# Patient Record
Sex: FEMALE | Race: WHITE | Employment: OTHER | URBAN - METROPOLITAN AREA
[De-identification: names, ages, dates, MRNs, and addresses within clinical notes are randomized per-mention and may not be internally consistent; named-entity substitution may affect disease eponyms.]

---

## 2017-01-16 ENCOUNTER — HOSPITAL ENCOUNTER (OUTPATIENT)
Dept: PHYSICAL THERAPY | Age: 72
Discharge: HOME | End: 2017-01-31
Attending: FAMILY MEDICINE | Admitting: FAMILY MEDICINE

## 2017-01-16 ASSESSMENT — PAIN SCALES - GENERAL: PAINLEVEL_OUTOF10: 4

## 2017-01-16 ASSESSMENT — PAIN DESCRIPTION - ORIENTATION: ORIENTATION: RIGHT

## 2017-01-16 ASSESSMENT — PAIN DESCRIPTION - LOCATION: LOCATION: SHOULDER

## 2017-01-16 ASSESSMENT — PAIN DESCRIPTION - ONSET: ONSET: SUDDEN

## 2017-01-16 ASSESSMENT — PAIN DESCRIPTION - PAIN TYPE: TYPE: ACUTE PAIN

## 2017-01-16 ASSESSMENT — PAIN DESCRIPTION - FREQUENCY: FREQUENCY: INTERMITTENT

## 2017-01-16 ASSESSMENT — PAIN DESCRIPTION - PROGRESSION: CLINICAL_PROGRESSION: NOT CHANGED

## 2017-01-23 ENCOUNTER — HOSPITAL ENCOUNTER (OUTPATIENT)
Dept: PHYSICAL THERAPY | Age: 72
Discharge: HOME | End: 2017-01-23

## 2017-01-26 ENCOUNTER — HOSPITAL ENCOUNTER (OUTPATIENT)
Dept: PHYSICAL THERAPY | Age: 72
Discharge: HOME | End: 2017-01-26

## 2017-02-01 ENCOUNTER — HOSPITAL ENCOUNTER (OUTPATIENT)
Dept: OTHER | Age: 72
Discharge: HOME | End: 2017-02-28
Attending: FAMILY MEDICINE | Admitting: FAMILY MEDICINE

## 2017-02-02 ENCOUNTER — HOSPITAL ENCOUNTER (OUTPATIENT)
Dept: PHYSICAL THERAPY | Age: 72
Discharge: HOME | End: 2017-02-02

## 2017-03-01 ENCOUNTER — HOSPITAL ENCOUNTER (OUTPATIENT)
Dept: OTHER | Age: 72
Discharge: HOME | End: 2017-03-06
Attending: FAMILY MEDICINE | Admitting: FAMILY MEDICINE

## 2020-07-16 ENCOUNTER — HOSPITAL ENCOUNTER (EMERGENCY)
Age: 75
Discharge: ANOTHER HOSPITAL | End: 2020-07-16
Attending: EMERGENCY MEDICINE
Payer: MEDICARE

## 2020-07-16 ENCOUNTER — HOSPITAL ENCOUNTER (INPATIENT)
Age: 75
LOS: 12 days | Discharge: NURSING, REHAB OR LONG-TERM CARE | DRG: 884 | End: 2020-07-28
Attending: PSYCHIATRY & NEUROLOGY | Admitting: PSYCHIATRY & NEUROLOGY
Payer: MEDICARE

## 2020-07-16 ENCOUNTER — APPOINTMENT (OUTPATIENT)
Dept: CT IMAGING | Age: 75
End: 2020-07-16
Payer: MEDICARE

## 2020-07-16 VITALS
BODY MASS INDEX: 22.67 KG/M2 | TEMPERATURE: 97 F | DIASTOLIC BLOOD PRESSURE: 82 MMHG | HEART RATE: 83 BPM | WEIGHT: 120 LBS | RESPIRATION RATE: 18 BRPM | SYSTOLIC BLOOD PRESSURE: 120 MMHG | OXYGEN SATURATION: 100 %

## 2020-07-16 LAB
ACETAMINOPHEN LEVEL: <5 UG/ML (ref 15–30)
ALBUMIN SERPL-MCNC: 4.5 GM/DL (ref 3.4–5)
ALCOHOL SCREEN SERUM: <0.01 %WT/VOL
ALP BLD-CCNC: 40 IU/L (ref 40–129)
ALT SERPL-CCNC: 10 U/L (ref 10–40)
AMPHETAMINES: NEGATIVE
ANION GAP SERPL CALCULATED.3IONS-SCNC: 12 MMOL/L (ref 4–16)
AST SERPL-CCNC: 18 IU/L (ref 15–37)
BACTERIA: ABNORMAL /HPF
BARBITURATE SCREEN URINE: NEGATIVE
BASOPHILS ABSOLUTE: 0 K/CU MM
BASOPHILS RELATIVE PERCENT: 0.5 % (ref 0–1)
BENZODIAZEPINE SCREEN, URINE: NEGATIVE
BILIRUB SERPL-MCNC: 0.3 MG/DL (ref 0–1)
BILIRUBIN URINE: NEGATIVE MG/DL
BLOOD, URINE: NEGATIVE
BUN BLDV-MCNC: 21 MG/DL (ref 6–23)
CALCIUM SERPL-MCNC: 9.7 MG/DL (ref 8.3–10.6)
CANNABINOID SCREEN URINE: NEGATIVE
CAST TYPE: ABNORMAL /HPF
CHLORIDE BLD-SCNC: 99 MMOL/L (ref 99–110)
CLARITY: ABNORMAL
CO2: 30 MMOL/L (ref 21–32)
COCAINE METABOLITE: NEGATIVE
COLOR: YELLOW
CREAT SERPL-MCNC: 0.8 MG/DL (ref 0.6–1.1)
CRYSTAL TYPE: NEGATIVE /HPF
DIFFERENTIAL TYPE: ABNORMAL
DOSE AMOUNT: ABNORMAL
DOSE AMOUNT: ABNORMAL
DOSE TIME: ABNORMAL
DOSE TIME: ABNORMAL
EOSINOPHILS ABSOLUTE: 0.2 K/CU MM
EOSINOPHILS RELATIVE PERCENT: 3.2 % (ref 0–3)
EPITHELIAL CELLS, UA: 2 /HPF
GFR AFRICAN AMERICAN: >60 ML/MIN/1.73M2
GFR NON-AFRICAN AMERICAN: >60 ML/MIN/1.73M2
GLUCOSE BLD-MCNC: 113 MG/DL (ref 70–99)
GLUCOSE, URINE: NEGATIVE MG/DL
HCT VFR BLD CALC: 37.2 % (ref 37–47)
HEMOGLOBIN: 12.6 GM/DL (ref 12.5–16)
IMMATURE NEUTROPHIL %: 0.3 % (ref 0–0.43)
KETONES, URINE: NEGATIVE MG/DL
LEUKOCYTE ESTERASE, URINE: ABNORMAL
LYMPHOCYTES ABSOLUTE: 1.7 K/CU MM
LYMPHOCYTES RELATIVE PERCENT: 28.8 % (ref 24–44)
MCH RBC QN AUTO: 30 PG (ref 27–31)
MCHC RBC AUTO-ENTMCNC: 33.9 % (ref 32–36)
MCV RBC AUTO: 88.6 FL (ref 78–100)
MONOCYTES ABSOLUTE: 0.4 K/CU MM
MONOCYTES RELATIVE PERCENT: 6.7 % (ref 0–4)
NITRITE URINE, QUANTITATIVE: NEGATIVE
OPIATES, URINE: NEGATIVE
OXYCODONE: NEGATIVE
PDW BLD-RTO: 11.9 % (ref 11.7–14.9)
PH, URINE: 8 (ref 5–8)
PHENCYCLIDINE, URINE: NEGATIVE
PLATELET # BLD: 306 K/CU MM (ref 140–440)
PMV BLD AUTO: 9.1 FL (ref 7.5–11.1)
POTASSIUM SERPL-SCNC: 3.3 MMOL/L (ref 3.5–5.1)
PROTEIN UA: NEGATIVE MG/DL
RBC # BLD: 4.2 M/CU MM (ref 4.2–5.4)
RBC URINE: 3 /HPF (ref 0–6)
SALICYLATE LEVEL: 0.6 MG/DL (ref 15–30)
SARS-COV-2, NAAT: NOT DETECTED
SEGMENTED NEUTROPHILS ABSOLUTE COUNT: 3.5 K/CU MM
SEGMENTED NEUTROPHILS RELATIVE PERCENT: 60.5 % (ref 36–66)
SODIUM BLD-SCNC: 141 MMOL/L (ref 135–145)
SOURCE: NORMAL
SPECIFIC GRAVITY UA: 1.01 (ref 1–1.03)
TOTAL IMMATURE NEUTOROPHIL: 0.02 K/CU MM
TOTAL PROTEIN: 7.2 GM/DL (ref 6.4–8.2)
UROBILINOGEN, URINE: 0.2 MG/DL (ref 0.2–1)
WBC # BLD: 5.9 K/CU MM (ref 4–10.5)
WBC UA: 8 /HPF (ref 0–5)

## 2020-07-16 PROCEDURE — 1240000000 HC EMOTIONAL WELLNESS R&B

## 2020-07-16 PROCEDURE — 81001 URINALYSIS AUTO W/SCOPE: CPT

## 2020-07-16 PROCEDURE — 99285 EMERGENCY DEPT VISIT HI MDM: CPT

## 2020-07-16 PROCEDURE — U0002 COVID-19 LAB TEST NON-CDC: HCPCS

## 2020-07-16 PROCEDURE — 85025 COMPLETE CBC W/AUTO DIFF WBC: CPT

## 2020-07-16 PROCEDURE — G0480 DRUG TEST DEF 1-7 CLASSES: HCPCS

## 2020-07-16 PROCEDURE — 70450 CT HEAD/BRAIN W/O DYE: CPT

## 2020-07-16 PROCEDURE — 80053 COMPREHEN METABOLIC PANEL: CPT

## 2020-07-16 PROCEDURE — 80307 DRUG TEST PRSMV CHEM ANLYZR: CPT
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
## 2020-07-16 NOTE — ED PROVIDER NOTES
applicable):  No follow-up provider specified. Disposition medications (if applicable):  New Prescriptions    No medications on file       ED Provider Disposition Time  DISPOSITION Decision To Transfer 07/16/2020 08:31:20 PM          Electronically signed by: Alysa Cristobal M.D., 7/16/2020 11:12 PM      This dictation was created with voice recognition software. While attempts have been made to review the dictation as it is transcribed, on occasion the spoken word can be misinterpreted by the technology leading to omissions or inappropriate words, phrases or sentences.         Genet Cason MD  07/16/20 6075
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
## 2020-07-16 NOTE — ED NOTES
Labs drawn per order. Pt tolerates well. Daughter remains at bedside. Sts will be staying with mother at this time. No acute distress. No further needs at this time.      Yair Borja RN  07/16/20 9302

## 2020-07-16 NOTE — ED NOTES
Pt assisted to BR and back to chair. Unable to urinate at this time. Daughter at bedside.      Mohamud Chávez RN  07/16/20 1914

## 2020-07-17 PROBLEM — F03.918 DEMENTIA WITH BEHAVIORAL DISTURBANCE: Status: ACTIVE | Noted: 2020-07-17

## 2020-07-17 PROBLEM — F01.518 SUBCORTICAL VASCULAR DEMENTIA WITH BEHAVIORAL DISTURBANCE: Chronic | Status: ACTIVE | Noted: 2020-07-17

## 2020-07-17 LAB — GLUCOSE BLD-MCNC: 108 MG/DL (ref 70–99)

## 2020-07-17 PROCEDURE — 87086 URINE CULTURE/COLONY COUNT: CPT

## 2020-07-17 PROCEDURE — 97161 PT EVAL LOW COMPLEX 20 MIN: CPT

## 2020-07-17 PROCEDURE — 6370000000 HC RX 637 (ALT 250 FOR IP): Performed by: NURSE PRACTITIONER

## 2020-07-17 PROCEDURE — 82140 ASSAY OF AMMONIA: CPT

## 2020-07-17 PROCEDURE — 1240000000 HC EMOTIONAL WELLNESS R&B

## 2020-07-17 PROCEDURE — 90792 PSYCH DIAG EVAL W/MED SRVCS: CPT | Performed by: NURSE PRACTITIONER

## 2020-07-17 PROCEDURE — 6370000000 HC RX 637 (ALT 250 FOR IP): Performed by: INTERNAL MEDICINE

## 2020-07-17 PROCEDURE — 97165 OT EVAL LOW COMPLEX 30 MIN: CPT

## 2020-07-17 PROCEDURE — 82962 GLUCOSE BLOOD TEST: CPT

## 2020-07-17 RX ORDER — ACETAMINOPHEN 325 MG/1
325 TABLET ORAL EVERY 4 HOURS PRN
Status: DISCONTINUED | OUTPATIENT
Start: 2020-07-17 | End: 2020-07-28 | Stop reason: HOSPADM

## 2020-07-17 RX ORDER — DIPHENHYDRAMINE HCL 25 MG
25 CAPSULE ORAL EVERY 6 HOURS PRN
Status: DISCONTINUED | OUTPATIENT
Start: 2020-07-17 | End: 2020-07-28 | Stop reason: HOSPADM

## 2020-07-17 RX ORDER — ACETAMINOPHEN 500 MG
500 TABLET ORAL EVERY 4 HOURS PRN
Status: DISCONTINUED | OUTPATIENT
Start: 2020-07-17 | End: 2020-07-28 | Stop reason: HOSPADM

## 2020-07-17 RX ORDER — LEVOTHYROXINE SODIUM 0.12 MG/1
125 TABLET ORAL
Status: DISCONTINUED | OUTPATIENT
Start: 2020-07-17 | End: 2020-07-18

## 2020-07-17 RX ORDER — ACETAMINOPHEN 325 MG/1
650 TABLET ORAL EVERY 4 HOURS PRN
Status: DISCONTINUED | OUTPATIENT
Start: 2020-07-17 | End: 2020-07-28 | Stop reason: HOSPADM

## 2020-07-17 RX ORDER — HALOPERIDOL 5 MG
5 TABLET ORAL EVERY 4 HOURS PRN
Status: DISCONTINUED | OUTPATIENT
Start: 2020-07-17 | End: 2020-07-28 | Stop reason: HOSPADM

## 2020-07-17 RX ORDER — TRAZODONE HYDROCHLORIDE 50 MG/1
50 TABLET ORAL NIGHTLY PRN
Status: DISCONTINUED | OUTPATIENT
Start: 2020-07-17 | End: 2020-07-28 | Stop reason: HOSPADM

## 2020-07-17 RX ORDER — EXEMESTANE 25 MG/1
25 TABLET ORAL DAILY
Status: DISCONTINUED | OUTPATIENT
Start: 2020-07-17 | End: 2020-07-18

## 2020-07-17 RX ORDER — HALOPERIDOL 5 MG/ML
5 INJECTION INTRAMUSCULAR EVERY 4 HOURS PRN
Status: DISCONTINUED | OUTPATIENT
Start: 2020-07-17 | End: 2020-07-28 | Stop reason: HOSPADM

## 2020-07-17 RX ORDER — DIVALPROEX SODIUM 125 MG/1
250 CAPSULE, COATED PELLETS ORAL NIGHTLY
Status: DISCONTINUED | OUTPATIENT
Start: 2020-07-17 | End: 2020-07-20

## 2020-07-17 RX ORDER — LORAZEPAM 2 MG/ML
1 INJECTION INTRAMUSCULAR EVERY 4 HOURS PRN
Status: DISCONTINUED | OUTPATIENT
Start: 2020-07-17 | End: 2020-07-28 | Stop reason: HOSPADM

## 2020-07-17 RX ORDER — PANTOPRAZOLE SODIUM 40 MG/1
40 TABLET, DELAYED RELEASE ORAL
Status: DISCONTINUED | OUTPATIENT
Start: 2020-07-18 | End: 2020-07-18

## 2020-07-17 RX ORDER — POLYETHYLENE GLYCOL 3350 17 G/17G
17 POWDER, FOR SOLUTION ORAL DAILY PRN
Status: DISCONTINUED | OUTPATIENT
Start: 2020-07-17 | End: 2020-07-28 | Stop reason: HOSPADM

## 2020-07-17 RX ORDER — DEXTROSE MONOHYDRATE 25 G/50ML
12.5 INJECTION, SOLUTION INTRAVENOUS PRN
Status: DISCONTINUED | OUTPATIENT
Start: 2020-07-17 | End: 2020-07-28 | Stop reason: HOSPADM

## 2020-07-17 RX ORDER — CEPHALEXIN 500 MG/1
500 CAPSULE ORAL EVERY 12 HOURS SCHEDULED
Status: DISCONTINUED | OUTPATIENT
Start: 2020-07-17 | End: 2020-07-25 | Stop reason: ALTCHOICE

## 2020-07-17 RX ORDER — EZETIMIBE 10 MG/1
10 TABLET ORAL DAILY
Status: DISCONTINUED | OUTPATIENT
Start: 2020-07-17 | End: 2020-07-18

## 2020-07-17 RX ORDER — NICOTINE POLACRILEX 4 MG
15 LOZENGE BUCCAL PRN
Status: DISCONTINUED | OUTPATIENT
Start: 2020-07-17 | End: 2020-07-28 | Stop reason: HOSPADM

## 2020-07-17 RX ORDER — DIPHENHYDRAMINE HYDROCHLORIDE 50 MG/ML
25 INJECTION INTRAMUSCULAR; INTRAVENOUS EVERY 6 HOURS PRN
Status: DISCONTINUED | OUTPATIENT
Start: 2020-07-17 | End: 2020-07-28 | Stop reason: HOSPADM

## 2020-07-17 RX ORDER — DEXTROSE MONOHYDRATE 50 MG/ML
1000 INJECTION, SOLUTION INTRAVENOUS PRN
Status: DISCONTINUED | OUTPATIENT
Start: 2020-07-17 | End: 2020-07-28 | Stop reason: HOSPADM

## 2020-07-17 RX ORDER — ASPIRIN 81 MG/1
81 TABLET ORAL DAILY
Status: DISCONTINUED | OUTPATIENT
Start: 2020-07-17 | End: 2020-07-18

## 2020-07-17 RX ORDER — LOSARTAN POTASSIUM 25 MG/1
25 TABLET ORAL DAILY
Status: DISCONTINUED | OUTPATIENT
Start: 2020-07-17 | End: 2020-07-18

## 2020-07-17 RX ORDER — LORAZEPAM 1 MG/1
1 TABLET ORAL EVERY 4 HOURS PRN
Status: DISCONTINUED | OUTPATIENT
Start: 2020-07-17 | End: 2020-07-28 | Stop reason: HOSPADM

## 2020-07-17 RX ADMIN — ASPIRIN 81 MG: 81 TABLET, COATED ORAL at 11:25

## 2020-07-17 RX ADMIN — DIVALPROEX SODIUM 250 MG: 125 CAPSULE ORAL at 20:19

## 2020-07-17 RX ADMIN — LEVOTHYROXINE SODIUM 125 MCG: 125 TABLET ORAL at 11:25

## 2020-07-17 RX ADMIN — METFORMIN HYDROCHLORIDE 500 MG: 500 TABLET ORAL at 17:08

## 2020-07-17 RX ADMIN — LOSARTAN POTASSIUM 25 MG: 25 TABLET ORAL at 11:25

## 2020-07-17 RX ADMIN — CEPHALEXIN 500 MG: 500 CAPSULE ORAL at 20:18

## 2020-07-17 RX ADMIN — EZETIMIBE 10 MG: 10 TABLET ORAL at 20:19

## 2020-07-17 ASSESSMENT — SLEEP AND FATIGUE QUESTIONNAIRES
DO YOU USE A SLEEP AID: NO
DO YOU HAVE DIFFICULTY SLEEPING: NO

## 2020-07-17 ASSESSMENT — LIFESTYLE VARIABLES: HISTORY_ALCOHOL_USE: NO

## 2020-07-17 ASSESSMENT — PAIN SCALES - GENERAL: PAINLEVEL_OUTOF10: 0

## 2020-07-17 NOTE — ED NOTES
Pt resting in chair. Daughter at bedside. No needs at this time.      Sandee Russell, YENI  07/16/20 2266

## 2020-07-17 NOTE — BH NOTE
Patient arrived on the unit at approximately [phone] via [facility] from [facility] ED. Patient placed in green gown and non skid socks. Patient is noted to be wearing glasses. Patient belongings collected. Patient has a pink bra (placed in bin 1049-1 in laundry room) and a pair of jeans that have been washed and returned to the patients room 1049-1. Patients other belongings collected black flats and a green shirt (has strings) and placed in locker 1049-A (CODE 1234). Provided patient with two of [facility] shirts, and a gown.

## 2020-07-17 NOTE — ED NOTES
Resting in chair. Daughter at bedside. Updates on plan of care. No needs at this time.      Baldomero Meigs, RN  07/16/20 5961

## 2020-07-17 NOTE — PROGRESS NOTES
Pt seen by therapist from approximately [phone] to complete pt's recreation/leisure assessment. Pt presented as angry with spouse and daughter. Pt has insight into her Alzheimer diagnosis and states \"I don't want to go through what my mother went through. \"  Pt at times able to make logical statements and at other times was word salad or responses was not r/t the question being asked. Pt did make several SI statements about wanting to ask the doctor to \"help me end it. \"  Pt able to identify 1 leisure interest.     Electronically signed by PETE Cisse MA on 7/17/2020 at 4:20 PM

## 2020-07-17 NOTE — CONSULTS
pertinent positives and negatives in hpi, otherwise negative. PHYSICAL EXAM:    Vitals:    Vitals:    07/17/20 0749   BP: 117/81   Pulse: 89   Resp: 18   Temp: 97.3 °F (36.3 °C)   SpO2:      No intake or output data in the 24 hours ending 07/17/20 0936    General appearance:No apparent distress, appears stated age and cooperative. HEENTNormal cephalic, atraumatic without obvious deformity. Pupils equal, round, and reactive to light. Extra ocular muscles intact. Conjunctivae/corneas clear. Neck: Supple, No jugular venous distention/bruits. Trachea midline without thyromegaly or adenopathy with full range of motion. Lungs: Clear to ascultation, bilaterally without Rales/Wheezes/Rhonchi with good respiratory effort. Heart: Regular rate and rhythm with Normal S1/S2 without  murmurs, rubs or gallops, point of maximum impulse non-displaced  Abdomen: Soft, non-tender or non-distended without rigidity or guarding and positive bowel sounds all four quadrants. Extremities: No clubbing, cyanosis, or edema bilaterally. Skin: Skin color, texture, turgor normal.  No rashes or lesions. Neurologic: Alert   neurovascularly intact with sensory/motor intact upper extremities/lower extremities, bilaterally. Cranial nerves:II-XII intact, grossly non-focal.    DATA:    CBC   Recent Labs     07/16/20  1845   WBC 5.9   HGB 12.6   HCT 37.2         BMP   Recent Labs     07/16/20  1845      K 3.3*   CL 99   CO2 30   BUN 21   CREATININE 0.8     LFT'S   Recent Labs     07/16/20  1845   AST 18   ALT 10   BILITOT 0.3   ALKPHOS 40     COAG No results for input(s): INR in the last 72 hours. CARDIAC ENZYMES  No results for input(s): CKTOTAL, CKMB, CKMBINDEX, TROPONINI in the last 72 hours.   U/A:    Lab Results   Component Value Date    COLORU YELLOW 07/16/2020    WBCUA 8 07/16/2020    RBCUA 3 07/16/2020    BACTERIA OCCASIONAL 07/16/2020    CLARITYU SLIGHTLY CLOUDY 07/16/2020    SPECGRAV 1.010 07/16/2020    LEUKOCYTESUR LARGE 07/16/2020    BLOODU NEGATIVE 07/16/2020       CXR:  I have reviewed the CXR with the following interpretation:  EKG:  I have reviewed the EKG with the following interpretation:    IMPRESSION/RECOMMENDATIONS:     uti- keflex  dm2-metformin, poct bid  Confusion- rule out metastatic cause, mri brain wo contrast. tsh pending; ua reviewed culture pending. Depression/dementia- psych management.     Diet: DIET GENERAL;  Code Status: Full Code      Patient Active Problem List   Diagnosis    Subcortical vascular dementia with [facility] disturbance ([facility] [address]    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HCC)    Diabetes mellitus ([facility] [address]    Hyperlipidemia    Hypertension    Thyroid disease       Gema Chen M.D.

## 2020-07-17 NOTE — PROGRESS NOTES
Speech Language Pathology  Viviana Montano   [birth year]  [phone]      Noted speech-language/cognitive evaluation ordered and chart reviewed 07/17/20. ST deferred assessment at this time d/t prioritization of work load and scheduling availability. Spoke with nursing regarding deferral and will plan to complete assessment as schedule allows.       Myrna Stephenson 87, CCC-SLP, 7/17/2020
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
## 2020-07-17 NOTE — PROGRESS NOTES
Occupational Therapy   Occupational Therapy Initial Assessment  Date: 2020   Patient Name: Cruz Pepe  MRN: [id number]     : 1945    Date of Service: 2020    Discharge Recommendations:  [address], 24 hour supervision or assist       Assessment   Performance deficits / Impairments: Decreased cognition  Assessment: 75 yo female admitted to [facility] unit with increased confusion with some insight that \"her brain is not right\"(per pt) and that they are looking at her brain. Physically, pt is I with ambulation although poor safety. She also id I with adls. No acute care OT goals are identified  Prognosis: Fair  Decision Making: Low Complexity  OT Education: OT Role;Orientation  REQUIRES OT FOLLOW UP: No  Activity Tolerance  Activity Tolerance: Patient Tolerated treatment well  Safety Devices  Safety Devices in place: Yes  Type of devices: Call light within reach; Left in bed           Patient Diagnosis(es): There were no encounter diagnoses. has a past medical history of 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[address] 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[address] Cancer ([address] Diabetes mellitus ([facility] [address] Hyperlipidemia, Hypertension, and Thyroid disease. has a past surgical history that includes Tonsillectomy; Hysterectomy; Cholecystectomy; Vulvectomy; knee surgery; Mastectomy; and lymphadenectomy.            Restrictions  Restrictions/Precautions  Restrictions/Precautions: Fall Risk    Subjective   General  Chart Reviewed: Yes  Patient assessed for rehabilitation services?: Yes     Social/Functional History  Social/Functional History  Lives With: Spouse  Type of Home: House  Home Layout: One level, Able to Live on Main level with bedroom/bathroom, Performs ADL's on one level  Home Access: Level entry  Bathroom Shower/Tub: Tub/Shower unit  Bathroom Toilet: Standard  Receives Help From: Family  ADL Assistance: [address]: Needs assistance  Homemaking Responsibilities: No  Ambulation Assistance: Independent  Transfer Assistance: Independent  Active : No  Mode of Transportation: Family       Objective   Vision: Impaired  Vision Exceptions: Wears glasses at all times  Hearing: Within functional limits    Orientation  Overall Orientation Status: (states she was born [birth year] but the chart says [birth year])  Observation/Palpation  Posture: Good  Observation: Pt was supine in bed HOB elevated  Balance  Sitting Balance: Independent  Standing Balance: Supervision(pt had 1 LOB when she didn't pay attention tobed being behind her)  ADL  UE Bathing: Supervision(has the AROM and was able to demonstrate she could do it)  LE Bathing: (simulated with AROM)  UE Dressing: Independent(shirt)  LE Dressing: Independent;Supervision(socks)  Tone RUE  RUE Tone: Normotonic  Tone LUE  LUE Tone: Normotonic     Bed mobility  Bridging: Independent  Supine to Sit: Independent  Sit to Supine: Independent  Transfers  Sit to stand: Independent  Stand to sit: Independent     Cognition  Overall Cognitive Status: Exceptions  Following Commands: Follows one step commands consistently  Attention Span: Attends with cues to redirect  Memory: Decreased short term memory  Problem Solving: Decreased awareness of errors  Insights: Decreased awareness of deficits  Initiation: Requires cues for some        Sensation  Overall Sensation Status: WFL        LUE AROM (degrees)  LUE AROM : WFL  RUE AROM (degrees)  RUE AROM : WFL  LUE Strength  Gross LUE Strength: WFL  RUE Strength  Gross RUE Strength: WFL                   Plan   Plan  Times per week: no OT goals identified    G-Code     OutComes Score                                                  AM-PAC Score       AM-PAC 6 click short form for inpatient daily activity:   How much help from another person does the patient currently need. .. Unable  Dep A Lot  Max A A Lot   Mod A A Little  Min A A Little   CGA  SBA None   Mod I  Indep  Sup   1.   Putting on and taking off regular

## 2020-07-17 NOTE — GROUP NOTE
Group Therapy Note    Date: 7/17/2020    Group Start Time: 0830  Group End Time: 2210    Number of Participants: 3/4    Type: Morning Goals Group/ Community Meeting    Group Topic/Objective: Set Goal For The Day and to review Unit Rules and Regulations. Patient's Goal:  Pt refused to state a goal.    Notes:  Pt presented as agitated and resistive to group. Pt refused to answer majority of therapist's questions and refused to fill out menu. Pt reports plans to refuse to eat while on the unit and states \"If you put food in front of me I will throw it at you. \"  Pt did state she feels tired and is angry at her daughter for having her admitted to the unit. Pt refused to rate her emotions and refused to state anything she is grateful for. Pt also not accepting of education on why family could not visit on the unit. Pt's nurse and NP informed. Depression (0-10): Pt endorsed, but refused to rate. Anxiety (0-10): 0    Irritability/Aggitation (0-10): Pt endorsed, but refused to rate. Status After Intervention:  Unchanged    Participation Level: Minimal    Participation Quality: Resistant    Speech:  normal    Thought Process/Content: Pt minimally engaged in group.     Affective Functioning: Congruent    Mood: irritable    Level of consciousness:  Preoccupied and Inattentive    Response to Learning: Resistant    Endings: None Reported    Modes of Intervention: Education, Support and Socialization    Discipline Responsible: Certified Therapeutic Recreation Specialist     Electronically signed by Sandee Vitale MA on 7/17/2020 at 9:10 AM

## 2020-07-17 NOTE — PROGRESS NOTES
Pt's daughter brought in clothing and personal items:    Rolling black luggage bag containing:      Kei Henriquez bear  2 books  Calabrese pants  Black socks  Pink underwear  Green underwear  Tan underwear  Black pants  2 white blouses  Blue butterfly nightgown  Pink flowered shirt  2 pair knee high hose  Pink cat night gown  Bra with breast prosthetic    Knee highs, socks and luggage placed in locker # 1049-01 code 5602

## 2020-07-17 NOTE — PROGRESS NOTES
Pt attended group this am for painting. Returned to room shortly after and refused breakfast and lunch. Med compliant and pleasant, but making comments about not wanting to go on living d/t dementia diagnosis. Emotional support given and listened to concerns. Given items from home including books and maria del rosario bear. Pt happy to have maria del rosario bear and holding it in bed. Urine sample obtained for urine culture and sent to lab. Pt resting comfortably in bed at this time.

## 2020-07-17 NOTE — GROUP NOTE
Group Therapy Note    Date: 7/17/2020    Group Start Time: 0394  Group End Time: 1600  Group Topic: Healthy Living/Wellness    [address]    PETE Grajeda, MA        Group Therapy Note    Attendees: 0/3       Notes:  Pt encouraged to attend, pt declined.        Discipline Responsible: Certified Therapeutic Recreation Specialist       Signature:  [place], [place]
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
## 2020-07-17 NOTE — PROGRESS NOTES
Pt came out for dinner after much encouragement. Ate all of chili, approx half of kavitha slaw and all of cake for dessert. Drank 240 lemonade and then retreated to her room.

## 2020-07-17 NOTE — GROUP NOTE
Group Therapy Note    Date: 7/17/2020    Group Start Time: 1030  Group End Time: 2763  Group Topic: Recreational    [address] [place], [place]        Group Therapy Note    Attendees: 4/4       Notes:  Pts participated in recreational therapy group; [address] Activity. Pts were encouraged to engage in a leisure activity to promote socialization, positive mood, and coping with negative emotions. Pt participated in the activity, but noted to require Mod prompting d/t wanting to insert herself into situation with agitated peer and demanding that therapist buy her supplies. Pt completed majority of her painting, but declined to finish reporting she was tired and wanting to lay down.      Status After Intervention:  Improved    Participation Level: Interactive    Participation Quality: Intrusive      Speech:  normal      Thought Process/Content: Delusional      Affective Functioning: Congruent      Mood: Bright      Level of consciousness:  Alert and Preoccupied      Response to Learning: Able to verbalize current knowledge/experience      Endings: None Reported    Modes of Intervention: Socialization and Activity      Discipline Responsible: Certified Therapeutic Recreation Specialist       Signature:  Abdelrahman Gardiner

## 2020-07-17 NOTE — PLAN OF CARE
[facility]  Initial Interdisciplinary Treatment Plan NOTE    Review Date & Time: 07/17/20  0830    Admission Type:   Admission Type:  Involuntary    Reason for admission:  Reason for Admission: Dementia with behavioral disturbance    Patient Admission Diagnosis:   Dementia with behavioral disturbance ([facility]     PATIENT STRENGTHS:  Patient Strengths Strengths: Connection to output provider, Positive Support, Medication Compliance, Social Skills  Patient Strengths and Limitations:Limitations: Perceives need for assistance with self-care, Unrealistic self-view  Addictive Behavior:Addictive Behavior  In the past 3 months, have you felt or has someone told you that you have a problem with:  : None  Do you have a history of Chemical Use?: No  Do you have a history of Alcohol Use?: No  Do you have a history of Street Drug Abuse?: No  Histroy of Prescripton Drug Abuse?: No  Medical Problems:  Past Medical History:   Diagnosis Date    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[facility]     Cancer ([facility]     left breast    Diabetes mellitus ([facility]     Hyperlipidemia     Hypertension     Thyroid disease        EDUCATION:   Learner Progress Toward Treatment Goals: Reviewed goals and plan of care    Method: Group    Outcome: Needs reinforcement and No evidence of Learning    PATIENT GOALS: Med titration, compliance with unit programming    PLAN/TREATMENT RECOMMENDATIONS UPDATE: Med titration    Estimated Length of Stay Update:  7 days  Estimated Discharge Date Update: 07/24/20  Discharge Criteria: Med titration    SHORT-TERM GOALS UPDATE:   Time frame for Short-Term Goals: 7 days    LONG-TERM GOALS UPDATE:   Time frame for Long-Term Goals: 7 days  Members Present in Team Meeting: See Signature Sheet      Ceil Mckenzie, MSW, LSW

## 2020-07-17 NOTE — BH NOTE
[facility]  Admission Note     Admission Type:   Admission Type:  Involuntary    Reason for admission:  Reason for Admission: Dementia with behavioral disturbance    PATIENT STRENGTHS:  Strengths: Connection to output provider, Positive Support, Medication Compliance, Social Skills    Patient Strengths and Limitations:  Limitations: Perceives need for assistance with self-care, Unrealistic self-view    Addictive Behavior:   Addictive Behavior  In the past 3 months, have you felt or has someone told you that you have a problem with:  : None  Do you have a history of Chemical Use?: No  Do you have a history of Alcohol Use?: No  Do you have a history of Street Drug Abuse?: No  Histroy of Prescripton Drug Abuse?: No    Medical Problems:   Past Medical History:   Diagnosis Date    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HCC)     Cancer ([facility] [facility]     left breast    Diabetes mellitus ([facility]     Hyperlipidemia     Hypertension     Thyroid disease        Status EXAM:  Status and Exam  Normal: No  Facial Expression: Brightened  Affect: Appropriate  Level of Consciousness: Confused  Mood:Normal: Yes  Motor Activity:Normal: Yes  Interview Behavior: Cooperative  Preception: [place] to Person, [place] to Place  Attention:Normal: Yes  Thought Processes: Tangential, Loose Assoc., Flt.of Ideas  Thought Content:Normal: No  Thought Content: Obsessions  Hallucinations: None  Delusions: Yes  Delusions: Obsessions  Memory:Normal: No  Memory: Poor Remote, Poor Recent  Insight and Judgment: No  Insight and Judgment: Poor Judgment, Poor Insight, Unrealistic  Present Suicidal Ideation: No  Present Homicidal Ideation: No    Tobacco Screening:  Practical Counseling, on admission, meghan X, if applicable and completed (first 3 are required if patient doesn't refuse):            ( )  Recognizing danger situations (included triggers and roadblocks)                    ( )  Coping skills (new ways to manage stress, exercise, relaxation techniques, changing routine, distraction)                                                           ( )  Basic information about quitting (benefits of quitting, techniques in how to quit, available resources  ( ) Referral for counseling faxed to [facility]                                           ( ) Patient refused counseling  (X) Patient has not smoked in the last 30 days      Pt admitted with followings belongings:  Dentures: None  Vision - Corrective Lenses: Glasses  Hearing Aid: None  Jewelry: None  Body Piercings Removed: N/A  Clothing: Undergarments (Comment), Pants, Shirt, Footwear  Were All Patient Medications Collected?: Not Applicable  Other Valuables: None     Valuables sent home with N/A. Valuables placed in safe in security envelope, number: N/A. Patient's home medications were N/A. Patient oriented to surroundings and program expectations and copy of patient rights given. Patient offered Psychiatric Advanced Directive: Refused. Received admission packet:  Yes. Consents reviewed, signed Yes. Patient verbalize understanding:  Yes. Patient education on precautions: Yes. Pt's shoes and blouse placed in locker 1049-1 with combination 1234. Pt has a prosthetic bra that is in her bin, in the laundry room when she is not wearing it.                Joshua Figueroa, RN, BSN

## 2020-07-17 NOTE — CARE COORDINATION
7/17/20, 9:45 AM EDT    **Assessment completed with patient's daughter and [address]    Reason for Referral: [facility] initial assessment  Mental Status: Confused  Decision Making Ability: No  Family/Social/Home Environment: spoke with POA who states the patient currently resides with Spouse/Significant Other  in a ranch style home and their support system includes Spouse/Significant Other, Children, Family Members. Current Services/ Equipment:   None  Patient Income source:Unemployed, and Income meets expenses. Concerns or Barriers to Discharge: yes - possible placement needed, HHC if not placed  Patient and/or family verbalized understanding of the plan of care and contributed to goal setting. Social/ Functional History    Lives With:    Type of Home: House  Home Layout: One level, Able to Live on Main level with bedroom/bathroom, Performs ADL's on one level  Home Access: Level entry  Bathroom Shower/ Tub: Tub/Shower unit, Walk-in shower  Bathroom Toilet: Standard  Other [address]:    Home Equipment[de-identified]    ADL Assistance: Needs assistance  Homemaking Assistance: Needs assistance  Ambulation Assistance: Independent  Transfer Assistance: Independent  Additional Comments:      Anticipated Needs per Patient:     Potential Assistance Needed: Extended Care Placement   Type of [address]  Patient expects to be discharged to: Home or SNF        Discharge Plan:  D/C to home or SNF, Cece Sheppard needed if going home. F/U [facility], no transport needed if going home.     Tiffanie Frye, MSW, LSW

## 2020-07-18 ENCOUNTER — APPOINTMENT (OUTPATIENT)
Dept: MRI IMAGING | Age: 75
DRG: 884 | End: 2020-07-18
Attending: PSYCHIATRY & NEUROLOGY
Payer: MEDICARE

## 2020-07-18 LAB
AMMONIA: 29 UMOL/L (ref 11–51)
ANION GAP SERPL CALCULATED.3IONS-SCNC: 15 MMOL/L (ref 4–16)
BASOPHILS ABSOLUTE: 0 K/CU MM
BASOPHILS RELATIVE PERCENT: 0.6 % (ref 0–1)
BUN BLDV-MCNC: 15 MG/DL (ref 6–23)
CALCIUM SERPL-MCNC: 9.7 MG/DL (ref 8.3–10.6)
CHLORIDE BLD-SCNC: 103 MMOL/L (ref 99–110)
CHOLESTEROL: 171 MG/DL
CO2: 25 MMOL/L (ref 21–32)
CREAT SERPL-MCNC: 0.8 MG/DL (ref 0.6–1.1)
DIFFERENTIAL TYPE: ABNORMAL
EOSINOPHILS ABSOLUTE: 0.2 K/CU MM
EOSINOPHILS RELATIVE PERCENT: 3.4 % (ref 0–3)
ESTIMATED AVERAGE GLUCOSE: 108 MG/DL
GFR AFRICAN AMERICAN: >60 ML/MIN/1.73M2
GFR NON-AFRICAN AMERICAN: >60 ML/MIN/1.73M2
GLUCOSE BLD-MCNC: 105 MG/DL (ref 70–99)
GLUCOSE BLD-MCNC: 107 MG/DL (ref 70–99)
GLUCOSE BLD-MCNC: 109 MG/DL (ref 70–99)
GLUCOSE BLD-MCNC: 137 MG/DL (ref 70–99)
HBA1C MFR BLD: 5.4 % (ref 4.2–6.3)
HCT VFR BLD CALC: 37.3 % (ref 37–47)
HDLC SERPL-MCNC: 39 MG/DL
HEMOGLOBIN: 12.3 GM/DL (ref 12.5–16)
IMMATURE NEUTROPHIL %: 1.2 % (ref 0–0.43)
LDL CHOLESTEROL DIRECT: 117 MG/DL
LYMPHOCYTES ABSOLUTE: 1.6 K/CU MM
LYMPHOCYTES RELATIVE PERCENT: 31.6 % (ref 24–44)
MCH RBC QN AUTO: 29.5 PG (ref 27–31)
MCHC RBC AUTO-ENTMCNC: 33 % (ref 32–36)
MCV RBC AUTO: 89.4 FL (ref 78–100)
MONOCYTES ABSOLUTE: 0.4 K/CU MM
MONOCYTES RELATIVE PERCENT: 8.3 % (ref 0–4)
PDW BLD-RTO: 11.8 % (ref 11.7–14.9)
PLATELET # BLD: 272 K/CU MM (ref 140–440)
PMV BLD AUTO: 8.9 FL (ref 7.5–11.1)
POTASSIUM SERPL-SCNC: 4.3 MMOL/L (ref 3.5–5.1)
RBC # BLD: 4.17 M/CU MM (ref 4.2–5.4)
SEGMENTED NEUTROPHILS ABSOLUTE COUNT: 2.8 K/CU MM
SEGMENTED NEUTROPHILS RELATIVE PERCENT: 54.9 % (ref 36–66)
SODIUM BLD-SCNC: 143 MMOL/L (ref 135–145)
T4 FREE: 2.14 NG/DL (ref 0.9–1.8)
TOTAL IMMATURE NEUTOROPHIL: 0.06 K/CU MM
TOTAL RETICULOCYTE COUNT: 0.05 K/CU MM
TRIGL SERPL-MCNC: 123 MG/DL
TSH HIGH SENSITIVITY: 0.04 UIU/ML (ref 0.27–4.2)
WBC # BLD: 5 K/CU MM (ref 4–10.5)

## 2020-07-18 PROCEDURE — 83721 ASSAY OF BLOOD LIPOPROTEIN: CPT

## 2020-07-18 PROCEDURE — 82962 GLUCOSE BLOOD TEST: CPT

## 2020-07-18 PROCEDURE — 84439 ASSAY OF FREE THYROXINE: CPT

## 2020-07-18 PROCEDURE — 6370000000 HC RX 637 (ALT 250 FOR IP): Performed by: NURSE PRACTITIONER

## 2020-07-18 PROCEDURE — 85025 COMPLETE CBC W/AUTO DIFF WBC: CPT

## 2020-07-18 PROCEDURE — 36415 COLL VENOUS BLD VENIPUNCTURE: CPT

## 2020-07-18 PROCEDURE — 99233 SBSQ HOSP IP/OBS HIGH 50: CPT | Performed by: NURSE PRACTITIONER

## 2020-07-18 PROCEDURE — 83036 HEMOGLOBIN GLYCOSYLATED A1C: CPT

## 2020-07-18 PROCEDURE — 82140 ASSAY OF AMMONIA: CPT

## 2020-07-18 PROCEDURE — 1240000000 HC EMOTIONAL WELLNESS R&B

## 2020-07-18 PROCEDURE — 80061 LIPID PANEL: CPT

## 2020-07-18 PROCEDURE — 6370000000 HC RX 637 (ALT 250 FOR IP): Performed by: INTERNAL MEDICINE

## 2020-07-18 PROCEDURE — 80048 BASIC METABOLIC PNL TOTAL CA: CPT

## 2020-07-18 PROCEDURE — 84443 ASSAY THYROID STIM HORMONE: CPT

## 2020-07-18 RX ORDER — ACETAMINOPHEN 160 MG
25 TABLET,DISINTEGRATING ORAL
COMMUNITY

## 2020-07-18 RX ORDER — CLOBETASOL PROPIONATE 0.5 MG/G
CREAM TOPICAL 2 TIMES DAILY
COMMUNITY

## 2020-07-18 RX ORDER — LEVOTHYROXINE SODIUM 0.1 MG/1
100 TABLET ORAL
Status: DISCONTINUED | OUTPATIENT
Start: 2020-07-19 | End: 2020-07-28 | Stop reason: HOSPADM

## 2020-07-18 RX ORDER — CLOBETASOL PROPIONATE 0.5 MG/G
CREAM TOPICAL 2 TIMES DAILY
Status: DISCONTINUED | OUTPATIENT
Start: 2020-07-18 | End: 2020-07-28 | Stop reason: HOSPADM

## 2020-07-18 RX ORDER — VALSARTAN AND HYDROCHLOROTHIAZIDE 160; 25 MG/1; MG/1
1 TABLET ORAL DAILY
COMMUNITY

## 2020-07-18 RX ORDER — VALSARTAN 160 MG/1
160 TABLET ORAL DAILY
Status: DISCONTINUED | OUTPATIENT
Start: 2020-07-18 | End: 2020-07-28 | Stop reason: HOSPADM

## 2020-07-18 RX ORDER — VALSARTAN AND HYDROCHLOROTHIAZIDE 160; 25 MG/1; MG/1
1 TABLET ORAL DAILY
Status: DISCONTINUED | OUTPATIENT
Start: 2020-07-18 | End: 2020-07-18 | Stop reason: SDUPTHER

## 2020-07-18 RX ORDER — HYDROCHLOROTHIAZIDE 25 MG/1
25 TABLET ORAL DAILY
Status: DISCONTINUED | OUTPATIENT
Start: 2020-07-18 | End: 2020-07-28 | Stop reason: HOSPADM

## 2020-07-18 RX ORDER — LANOLIN ALCOHOL/MO/W.PET/CERES
1000 CREAM (GRAM) TOPICAL DAILY
Status: DISCONTINUED | OUTPATIENT
Start: 2020-07-18 | End: 2020-07-28 | Stop reason: HOSPADM

## 2020-07-18 RX ORDER — AMLODIPINE BESYLATE 2.5 MG/1
2.5 TABLET ORAL EVERY EVENING
COMMUNITY

## 2020-07-18 RX ORDER — FAMOTIDINE 20 MG/1
20 TABLET, FILM COATED ORAL 2 TIMES DAILY
COMMUNITY

## 2020-07-18 RX ORDER — LANOLIN ALCOHOL/MO/W.PET/CERES
1000 CREAM (GRAM) TOPICAL DAILY
COMMUNITY

## 2020-07-18 RX ORDER — SERTRALINE HYDROCHLORIDE 25 MG/1
25 TABLET, FILM COATED ORAL DAILY
Status: DISCONTINUED | OUTPATIENT
Start: 2020-07-18 | End: 2020-07-19

## 2020-07-18 RX ORDER — FAMOTIDINE 10 MG
20 TABLET ORAL 2 TIMES DAILY
Status: DISCONTINUED | OUTPATIENT
Start: 2020-07-18 | End: 2020-07-28 | Stop reason: HOSPADM

## 2020-07-18 RX ORDER — AMLODIPINE BESYLATE 2.5 MG/1
2.5 TABLET ORAL EVERY EVENING
Status: DISCONTINUED | OUTPATIENT
Start: 2020-07-18 | End: 2020-07-27

## 2020-07-18 RX ORDER — MIRTAZAPINE 15 MG/1
7.5 TABLET, FILM COATED ORAL NIGHTLY
Status: ON HOLD | COMMUNITY
End: 2020-07-24 | Stop reason: HOSPADM

## 2020-07-18 RX ADMIN — CYANOCOBALAMIN TAB 1000 MCG 1000 MCG: 1000 TAB at 17:21

## 2020-07-18 RX ADMIN — ASPIRIN 81 MG: 81 TABLET, COATED ORAL at 10:02

## 2020-07-18 RX ADMIN — FAMOTIDINE 20 MG: 10 TABLET ORAL at 20:40

## 2020-07-18 RX ADMIN — METFORMIN HYDROCHLORIDE 500 MG: 500 TABLET ORAL at 10:02

## 2020-07-18 RX ADMIN — PANTOPRAZOLE SODIUM 40 MG: 40 TABLET, DELAYED RELEASE ORAL at 10:03

## 2020-07-18 RX ADMIN — EZETIMIBE 10 MG: 10 TABLET ORAL at 10:02

## 2020-07-18 RX ADMIN — LOSARTAN POTASSIUM 25 MG: 25 TABLET ORAL at 10:03

## 2020-07-18 RX ADMIN — LEVOTHYROXINE SODIUM 125 MCG: 125 TABLET ORAL at 10:02

## 2020-07-18 RX ADMIN — CLOBETASOL PROPIONATE: 0.5 CREAM TOPICAL at 21:10

## 2020-07-18 RX ADMIN — CEPHALEXIN 500 MG: 500 CAPSULE ORAL at 20:40

## 2020-07-18 RX ADMIN — CEPHALEXIN 500 MG: 500 CAPSULE ORAL at 10:03

## 2020-07-18 RX ADMIN — SERTRALINE 25 MG: 25 TABLET, FILM COATED ORAL at 12:12

## 2020-07-18 RX ADMIN — DIVALPROEX SODIUM 250 MG: 125 CAPSULE ORAL at 20:40

## 2020-07-18 ASSESSMENT — PAIN SCALES - GENERAL: PAINLEVEL_OUTOF10: 0

## 2020-07-18 NOTE — PLAN OF CARE
Problem: Altered Mood, Deterioration in Function:  Goal: Ability to perform activities of daily living will improve  Description: Ability to perform activities of daily living will improve  Outcome: Ongoing  Goal: Maintenance of adequate nutrition will improve  Description: Maintenance of adequate nutrition will improve  Outcome: Ongoing  Goal: Participates in care planning  Description: Participates in care planning  Outcome: Ongoing  Goal: Patient specific goal  Description: Patient specific goal  Outcome: Ongoing     Problem: Nutrition Deficit:  Goal: Ability to achieve adequate nutritional intake will improve  Description: Ability to achieve adequate nutritional intake will improve  Outcome: Ongoing
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
## 2020-07-18 NOTE — GROUP NOTE
Group Therapy Note    Date: 7/18/2020    Group Start Time: 8095  Group End Time: 1200  Group Topic: Psychoeducation    Alena Gu, MSWKARLYW    Group Therapy Note    Attendees: 3/4       Patient's Goal: \"to go home\"    Notes: Patient participated in group. Open discussion with peers about music and mental health. Patient listen to and sang along with songs played in the group. Status After Intervention:  Improved    Participation Level:  Active Listener and Interactive    Participation Quality: Appropriate and Sharing    Speech:  normal    Thought Process/Content: Linear    Affective Functioning: Congruent    Mood: elevated    Level of consciousness:  Alert and Attentive    Response to Learning: Able to verbalize current knowledge/experience    Endings: None Reported    Modes of Intervention: Education, Socialization and Activity    Discipline Responsible: /Counselor    Signature: Patria Kanner, MSW, HALEY

## 2020-07-18 NOTE — PROGRESS NOTES
Asked by Psy-NP  About home medication. I will be glad to review the medication once they are updated. Hold cirteria placed on metformin and losartan-HCTZ    Hold metformin for bg<150 dt high risk of hypoglycemia    Hold arb-HCTZ for systolic <276 dt high risk of hypoglycemia      Patient's arb and antihypertensive medication are quite similar to input on another chart and will have staff double check this medication list again.
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
## 2020-07-18 NOTE — BH NOTE
difficulties. At time she has isolated herself to her room and rest on her bed. Awake at all checks when she is in her room. Slightly intrusive in a caring manner with peers. She requested at one point to phone her  and became agitated with  on the phone.  then spoke with staff and repeated that all orders need to be verified and approved with family.  wants Dr. Carla Saini contacted for medical records [phone]. He states he had contacted Dr. Devaughn Bro and the doctor states no medication changes need to occur and patient should remain on the medications she had been on. All faxed papers have been placed in paper chart. Patient has continued confused and intermittently agitated for brief periods. Patient is not always receptive to staff support. Safety monitoring will continued.

## 2020-07-18 NOTE — PLAN OF CARE
Problem: Altered Mood, Deterioration in Function:  Goal: Ability to perform activities of daily living will improve  Description: Ability to perform activities of daily living will improve  7/17/2020 2108 by Danya Rivera RN  Outcome: Ongoing  7/17/2020 0922 by Genesis Zelaya RN  Outcome: Ongoing  Goal: Maintenance of adequate nutrition will improve  Description: Maintenance of adequate nutrition will improve  7/17/2020 2108 by Danya Rivera RN  Outcome: Ongoing  7/17/2020 0922 by Genesis Zelaya RN  Outcome: Ongoing  Goal: Participates in care planning  Description: Participates in care planning  7/17/2020 2108 by Danya Rivera RN  Outcome: Ongoing  7/17/2020 0922 by Genesis Zelaya RN  Outcome: Ongoing  Goal: Patient specific goal  Description: Patient specific goal  7/17/2020 2108 by Danya Rivera RN  Outcome: Ongoing  7/17/2020 0922 by Genesis Zelaya RN  Outcome: Ongoing     Problem: Nutrition Deficit:  Goal: Ability to achieve adequate nutritional intake will improve  Description: Ability to achieve adequate nutritional intake will improve  7/17/2020 2108 by Danya Rivera RN  Outcome: Ongoing  7/17/2020 0922 by Genesis Zelaya RN  Outcome: Ongoing
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
## 2020-07-19 LAB
GLUCOSE BLD-MCNC: 112 MG/DL (ref 70–99)
GLUCOSE BLD-MCNC: 169 MG/DL (ref 70–99)
GLUCOSE BLD-MCNC: 192 MG/DL (ref 70–99)

## 2020-07-19 PROCEDURE — 6370000000 HC RX 637 (ALT 250 FOR IP): Performed by: NURSE PRACTITIONER

## 2020-07-19 PROCEDURE — 1240000000 HC EMOTIONAL WELLNESS R&B

## 2020-07-19 PROCEDURE — 99233 SBSQ HOSP IP/OBS HIGH 50: CPT | Performed by: NURSE PRACTITIONER

## 2020-07-19 PROCEDURE — 82962 GLUCOSE BLOOD TEST: CPT

## 2020-07-19 PROCEDURE — 6370000000 HC RX 637 (ALT 250 FOR IP): Performed by: INTERNAL MEDICINE

## 2020-07-19 PROCEDURE — 6370000000 HC RX 637 (ALT 250 FOR IP): Performed by: PSYCHIATRY & NEUROLOGY

## 2020-07-19 RX ORDER — MIRTAZAPINE 15 MG/1
15 TABLET, ORALLY DISINTEGRATING ORAL NIGHTLY
Status: DISCONTINUED | OUTPATIENT
Start: 2020-07-19 | End: 2020-07-28 | Stop reason: HOSPADM

## 2020-07-19 RX ORDER — RISPERIDONE 0.5 MG/1
0.5 TABLET, FILM COATED ORAL DAILY
Status: DISCONTINUED | OUTPATIENT
Start: 2020-07-19 | End: 2020-07-28 | Stop reason: HOSPADM

## 2020-07-19 RX ADMIN — FAMOTIDINE 20 MG: 10 TABLET ORAL at 10:32

## 2020-07-19 RX ADMIN — METFORMIN HYDROCHLORIDE 1000 MG: 500 TABLET ORAL at 18:28

## 2020-07-19 RX ADMIN — CEPHALEXIN 500 MG: 500 CAPSULE ORAL at 20:40

## 2020-07-19 RX ADMIN — VALSARTAN 160 MG: 160 TABLET, FILM COATED ORAL at 10:32

## 2020-07-19 RX ADMIN — DIVALPROEX SODIUM 250 MG: 125 CAPSULE ORAL at 20:39

## 2020-07-19 RX ADMIN — LEVOTHYROXINE SODIUM 100 MCG: 100 TABLET ORAL at 10:38

## 2020-07-19 RX ADMIN — CLOBETASOL PROPIONATE: 0.5 CREAM TOPICAL at 20:39

## 2020-07-19 RX ADMIN — FAMOTIDINE 20 MG: 10 TABLET ORAL at 20:40

## 2020-07-19 RX ADMIN — CEPHALEXIN 500 MG: 500 CAPSULE ORAL at 10:39

## 2020-07-19 RX ADMIN — CLOBETASOL PROPIONATE: 0.5 CREAM TOPICAL at 10:32

## 2020-07-19 RX ADMIN — RISPERIDONE 0.5 MG: 0.5 TABLET ORAL at 11:31

## 2020-07-19 RX ADMIN — TRAZODONE HYDROCHLORIDE 50 MG: 50 TABLET ORAL at 20:40

## 2020-07-19 RX ADMIN — CYANOCOBALAMIN TAB 1000 MCG 1000 MCG: 1000 TAB at 10:33

## 2020-07-19 RX ADMIN — HYDROCHLOROTHIAZIDE 25 MG: 25 TABLET ORAL at 10:33

## 2020-07-19 RX ADMIN — MIRTAZAPINE 15 MG: 15 TABLET, ORALLY DISINTEGRATING ORAL at 20:40

## 2020-07-19 ASSESSMENT — PAIN SCALES - GENERAL
PAINLEVEL_OUTOF10: 0
PAINLEVEL_OUTOF10: 0
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
## 2020-07-19 NOTE — PLAN OF CARE
[address] [place] [address]  Day 3 Interdisciplinary Treatment Plan NOTE    Review Date & Time: 07/19/2020 9:00am    Admission Type:   Admission Type: Involuntary    Reason for admission:  Reason for Admission: Dementia with behavioral disturbance    Patient Diagnosis: Subcortical vascular dementia with behavioral disturbance (HCC)     PATIENT STRENGTHS:  Patient Strengths Strengths: Connection to output provider, Positive Support, Medication Compliance, Social Skills  Patient Strengths and Limitations:Limitations: Perceives need for assistance with self-care, Unrealistic self-view  Addictive Behavior:Addictive Behavior  In the past 3 months, have you felt or has someone told you that you have a problem with:  : None  Do you have a history of Chemical Use?: No  Do you have a history of Alcohol Use?: No  Do you have a history of Street Drug Abuse?: No  Histroy of Prescripton Drug Abuse?: No  Medical Problems:  Past Medical History:   Diagnosis Date    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HCC)     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HCC)     Cancer ([facility]     left breast    Diabetes mellitus ([facility]     Hyperlipidemia     Hypertension     Thyroid disease      Risk:  Fall Risk Total: 97  Sagar Scale Sagar Scale Score: 21  BVC Total: 2    Status EXAM:   Status and Exam  Normal: No  Facial Expression: Worried  Affect: Appropriate  Level of Consciousness: Confused  Mood:Normal: No  Mood: Depressed, Anxious, Labile, Suspicious, Irritable  Motor Activity:Normal: Yes  Motor Activity: Decreased  Interview Behavior: Impulsive  Preception: Browns to Person  Attention:Normal: No  Attention: Unable to Concentrate  Thought Processes: Tangential, Loose Assoc. Thought Content:Normal: No  Thought Content: Delusions, Obsessions, Paranoia, Poverty of Content  Hallucinations:  Other (Comment)(not observed thus far)  Delusions: Yes  Delusions: Obsessions  Memory:Normal: No  Memory: Confabulation, Poor Recent, Poor Remote  Insight and Judgment: No  Insight and Judgment: Poor Judgment, Poor Insight, Unmotivated, Unrealistic  Present Suicidal Ideation: No  Present Homicidal Ideation: No    Daily Assessment Last Entry:   Daily Sleep (WDL): Within Defined Limits  Patient Currently in Pain: Denies  Daily Nutrition (WDL): Exceptions to WDL  Appetite Change: Decreased  Barriers to Nutrition: Elderly patient, Blood sugar control, Cognitive impairment  Level of Assistance: Independent/Self    Patient Monitoring:  Frequency of Checks: 4 times per hour, close    Psychiatric Symptoms:   Depression Symptoms  Depression Symptoms: Appetite change, Impaired concentration, Increased irritability, Loss of interest  Anxiety Symptoms  Anxiety Symptoms: Obsessions, Unexplained fears  Ninoska Symptoms  Ninoska Symptoms: Labile, Poor judgment     Psychosis Symptoms  Delusion Type: Paranoid, Persecutory    Suicide Risk CSSR-S:  1) Within the past month, have you wished you were dead or wished you could go to sleep and not wake up? : No  2) Have you actually had any thoughts of killing yourself? : No  6) Have you ever done anything, started to do anything, or prepared to do anything to end your life?: No    EDUCATION:   Learner Progress Toward Treatment Goals: Reviewed results and recommendations of this team    Method: Group    Outcome: Needs reinforcement    PATIENT GOALS: Med titration and compliance with unit programming    PLAN/TREATMENT RECOMMENDATIONS UPDATE: Med titration    Estimated Length of Stay Update:  7 days  Estimated Discharge Date Update: 07/24/2020  Discharge criteria: Med titration    SHORT-TERM GOALS UPDATE:   Time frame for Short-Term Goals: 7 days    LONG-TERM GOALS UPDATE:   Time frame for Long-Term Goals: 7 days  Members Present in Team Meeting: See Signature Sheet    Landry Pathak MSW, LSW

## 2020-07-19 NOTE — PLAN OF CARE
Problem: Altered Mood, Deterioration in Function:  Goal: Ability to perform activities of daily living will improve  Description: Ability to perform activities of daily living will improve  7/18/2020 2208 by Luz Pineda RN  Outcome: Ongoing  7/18/2020 1606 by Ck Em RN  Outcome: Ongoing  Goal: Maintenance of adequate nutrition will improve  Description: Maintenance of adequate nutrition will improve  7/18/2020 2208 by Luz Pineda RN  Outcome: Ongoing  7/18/2020 1606 by Ck Em RN  Outcome: Ongoing  Goal: Participates in care planning  Description: Participates in care planning  7/18/2020 2208 by Luz Pineda RN  Outcome: Ongoing  7/18/2020 1606 by Ck Em RN  Outcome: Ongoing  Goal: Patient specific goal  Description: Patient specific goal  7/18/2020 2208 by Luz Pineda RN  Outcome: Ongoing  7/18/2020 1606 by Ck Em RN  Outcome: Ongoing     Problem: Nutrition Deficit:  Goal: Ability to achieve adequate nutritional intake will improve  Description: Ability to achieve adequate nutritional intake will improve  7/18/2020 2208 by Luz Pineda RN  Outcome: Ongoing  7/18/2020 1606 by Ck Em RN  Outcome: Ongoing

## 2020-07-20 PROBLEM — G30.1 LATE ONSET ALZHEIMER'S DISEASE WITH BEHAVIORAL DISTURBANCE (HCC): Chronic | Status: ACTIVE | Noted: 2020-07-20

## 2020-07-20 PROBLEM — F02.818 LATE ONSET ALZHEIMER'S DISEASE WITH BEHAVIORAL DISTURBANCE (HCC): Chronic | Status: ACTIVE | Noted: 2020-07-20

## 2020-07-20 LAB
CULTURE: NORMAL
GLUCOSE BLD-MCNC: 157 MG/DL (ref 70–99)
GLUCOSE BLD-MCNC: 97 MG/DL (ref 70–99)
Lab: NORMAL
SPECIMEN: NORMAL

## 2020-07-20 PROCEDURE — 6370000000 HC RX 637 (ALT 250 FOR IP): Performed by: NURSE PRACTITIONER

## 2020-07-20 PROCEDURE — 99233 SBSQ HOSP IP/OBS HIGH 50: CPT | Performed by: NURSE PRACTITIONER

## 2020-07-20 PROCEDURE — 1240000000 HC EMOTIONAL WELLNESS R&B

## 2020-07-20 PROCEDURE — 6370000000 HC RX 637 (ALT 250 FOR IP): Performed by: INTERNAL MEDICINE

## 2020-07-20 PROCEDURE — 82962 GLUCOSE BLOOD TEST: CPT

## 2020-07-20 PROCEDURE — 6370000000 HC RX 637 (ALT 250 FOR IP): Performed by: PSYCHIATRY & NEUROLOGY

## 2020-07-20 RX ORDER — DIVALPROEX SODIUM 125 MG/1
375 CAPSULE, COATED PELLETS ORAL NIGHTLY
Status: DISCONTINUED | OUTPATIENT
Start: 2020-07-21 | End: 2020-07-28 | Stop reason: HOSPADM

## 2020-07-20 RX ADMIN — HYDROCHLOROTHIAZIDE 25 MG: 25 TABLET ORAL at 09:46

## 2020-07-20 RX ADMIN — FAMOTIDINE 20 MG: 10 TABLET ORAL at 19:34

## 2020-07-20 RX ADMIN — VALSARTAN 160 MG: 160 TABLET, FILM COATED ORAL at 01:15

## 2020-07-20 RX ADMIN — MIRTAZAPINE 15 MG: 15 TABLET, ORALLY DISINTEGRATING ORAL at 19:34

## 2020-07-20 RX ADMIN — FAMOTIDINE 20 MG: 10 TABLET ORAL at 09:45

## 2020-07-20 RX ADMIN — RISPERIDONE 0.5 MG: 0.5 TABLET ORAL at 09:46

## 2020-07-20 RX ADMIN — HALOPERIDOL 5 MG: 5 TABLET ORAL at 19:36

## 2020-07-20 RX ADMIN — CLOBETASOL PROPIONATE: 0.5 CREAM TOPICAL at 19:45

## 2020-07-20 RX ADMIN — METFORMIN HYDROCHLORIDE 1000 MG: 500 TABLET ORAL at 09:46

## 2020-07-20 RX ADMIN — CEPHALEXIN 500 MG: 500 CAPSULE ORAL at 19:34

## 2020-07-20 RX ADMIN — DIPHENHYDRAMINE HYDROCHLORIDE 25 MG: 25 CAPSULE ORAL at 19:36

## 2020-07-20 RX ADMIN — CYANOCOBALAMIN TAB 1000 MCG 1000 MCG: 1000 TAB at 09:46

## 2020-07-20 RX ADMIN — CLOBETASOL PROPIONATE: 0.5 CREAM TOPICAL at 09:46

## 2020-07-20 RX ADMIN — DIVALPROEX SODIUM 250 MG: 125 CAPSULE ORAL at 19:35

## 2020-07-20 RX ADMIN — LEVOTHYROXINE SODIUM 100 MCG: 100 TABLET ORAL at 09:46

## 2020-07-20 RX ADMIN — CEPHALEXIN 500 MG: 500 CAPSULE ORAL at 09:45

## 2020-07-20 ASSESSMENT — PAIN SCALES - GENERAL
PAINLEVEL_OUTOF10: 0
PAINLEVEL_OUTOF10: 0

## 2020-07-20 NOTE — PLAN OF CARE
Problem: Altered Mood, Deterioration in Function:  Goal: Ability to perform activities of daily living will improve  Description: Ability to perform activities of daily living will improve  7/19/2020 2108 by Polly Kiran LPN  Outcome: Ongoing  7/19/2020 1317 by Tra Clark RN  Outcome: Ongoing  Goal: Maintenance of adequate nutrition will improve  Description: Maintenance of adequate nutrition will improve  7/19/2020 2108 by Polly Kiran LPN  Outcome: Ongoing  7/19/2020 1317 by Tra Clark RN  Outcome: Ongoing  Goal: Participates in care planning  Description: Participates in care planning  7/19/2020 2108 by Polly Kiran LPN  Outcome: Ongoing  7/19/2020 1317 by Tra Clark RN  Outcome: Ongoing  Goal: Patient specific goal  Description: Patient specific goal  7/19/2020 2108 by Polly Kiran LPN  Outcome: Ongoing  7/19/2020 1317 by Tra Clark RN  Outcome: Ongoing     Problem: Nutrition Deficit:  Goal: Ability to achieve adequate nutritional intake will improve  Description: Ability to achieve adequate nutritional intake will improve  7/19/2020 2108 by Polly Kiran LPN  Outcome: Ongoing  7/19/2020 1317 by Tra Clark RN  Outcome: Ongoing
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
## 2020-07-20 NOTE — BH NOTE
Patient was in shower at start of shift, came to nurses station and spoke with day shift nurse. She then went to her room and remained there. She is alert, but confused. Oriented to self and she is incont of urine. She has been pleasant and talks of a man that she has seen and is concerned he will come into her room. This nurse assured her there was no man on the unit, an I was here all night. She then stated she went to care for her mom who was not doing well, and she is now here because of it. She has visual hallucinations, denies pain, SI, HI, an auditory hallucinations. She has a mastectomy on the left d/t hx of breast cancer. Her blood sugar was 192 and she took her night medication with some encouragement. She is now sleeping with room light on and call light is within reach.

## 2020-07-20 NOTE — GROUP NOTE
Group Therapy Note    Date: 7/20/2020    Group Start Time: 4040  Group End Time: 3413    Number of Participants: 5/5    Type: Morning Goals Group/ Community Meeting    Group Topic/Objective: Set Goal For The Day and to review Unit Rules and Regulations. Patient's Goal:  Pt states her goal is \"I'd like to sit down. \"    Notes:  Pt presented as pleasant and cooperative during. Pt's responses were at times nonsensical and pt demonstrated no insight into inappropriateness of responses. For example; when asked how long she slept pt stated \"70. \"  Pt also went back and forth between stating she was tired and then denying she was tired. Pt states she feels \"tired\" and is grateful for \"when I was sleeping. \"  Pt reports restful sleep. Depression (0-10): 0    Anxiety (0-10): 0    Irritability/Aggitation (0-10): 0    Status After Intervention:  Improved    Participation Level:  Active Listener and Interactive    Participation Quality: Appropriate, Attentive and Sharing    Speech:  normal    Thought Process/Content: Confused    Affective Functioning: Congruent    Mood: Bright    Level of consciousness:  Alert and Attentive    Response to Learning: Able to verbalize current knowledge/experience    Endings: None Reported    Modes of Intervention: Education, Support and Socialization    Discipline Responsible: Certified Therapeutic Recreation Specialist     Electronically signed by Joanne Lozano MA on 7/20/2020 at 9:46 AM

## 2020-07-20 NOTE — GROUP NOTE
Group Therapy Note    Date: 7/20/2020    Group Start Time: 1030  Group End Time: 1120  Group Topic: Psychoeducation    [address]    Daniel Merrill, CTRS, MA        Group Therapy Note    Attendees: 5/6       Notes:  Pts participated in recreational therapy group; Getting To Know You. Pts and therapist utilized various activities to reminisce about their past and share stories. Purpose was to encourage positive thinking and socialization. Pt attended group, but unable to participate appropriately. Pt's thought process noted to be disorganized and responses were never related to topic. For example; when asking who gave her her name; pt started talking about \"the two Yazidi girls that lived on either side of me. \"  Pt also noted to be disruptive attempted to tell peers what to do in group. Status After Intervention:  Unchanged    Participation Level: Monopolizing    Participation Quality: Inappropriate and Intrusive      Speech:  normal      Thought Process/Content: Disorganized      Affective Functioning: Congruent      Mood: Bright      Level of consciousness:  Preoccupied and Inattentive      Response to Learning: Pt unable to demonstrate response to learning at this time.        Endings: None Reported    Modes of Intervention: Support, Socialization, Exploration and Activity      Discipline Responsible: Certified Therapeutic Recreation Specialist       Signature:  Daniel Merrill [place], [address] [place] [address] [place]
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
## 2020-07-20 NOTE — BH NOTE
After patient had her video conference with her family, she was noted to be very upset and agitated from the increased confusion brought on by her meeting. She was noted to be talking with peers at the dining room table when she (during her rambling) stated \"I may as well kill myself\". She was questioned about this statement and was not able to recall the statement and stated she doesn't know what the man is up to. \"The man\" is a fixed delusion, who she is not able to verbalize who he is. She questions what he wants and why he was at her bed? Reassurance that there is no man on the unit, she then said, it was before she came here, but is not able to say where she is. Oriented to self only. Dr. Zander Escobar notified. Patient to be monitored closely for this evening. If she continues to verbalize thoughts of self harm, or has actions of such, 1 to 1 will be ordered. Patient's level of dementia and short term memory may self resolve the upset and frustrations she had during the meeting. Patient at this time is by the TV watching with others, and is noted to be laughing and distracted. Close monitoring will continue. PRN of po Haldol and Benadryl given to help patient with prior anxiety.

## 2020-07-20 NOTE — GROUP NOTE
Group Therapy Note    Date: 7/20/2020    Group Start Time: 1300  Group End Time: 1330    Number of Participants: 3/6    Type: Exercise/Recreation Group    Group Topic/Objective: Chair Exercises     Notes:  Pt encouraged to attend, pt refused.      Discipline Responsible: Certified Therapeutic Recreation Specialist     Electronically signed by Liu Gant [place], [place] on 7/20/2020 at 1:39 PM

## 2020-07-21 LAB
GLUCOSE BLD-MCNC: 111 MG/DL (ref 70–99)
GLUCOSE BLD-MCNC: 89 MG/DL (ref 70–99)

## 2020-07-21 PROCEDURE — 82962 GLUCOSE BLOOD TEST: CPT

## 2020-07-21 PROCEDURE — 6370000000 HC RX 637 (ALT 250 FOR IP): Performed by: INTERNAL MEDICINE

## 2020-07-21 PROCEDURE — 6370000000 HC RX 637 (ALT 250 FOR IP): Performed by: NURSE PRACTITIONER

## 2020-07-21 PROCEDURE — 1240000000 HC EMOTIONAL WELLNESS R&B

## 2020-07-21 PROCEDURE — 6370000000 HC RX 637 (ALT 250 FOR IP): Performed by: PSYCHIATRY & NEUROLOGY

## 2020-07-21 PROCEDURE — 99233 SBSQ HOSP IP/OBS HIGH 50: CPT | Performed by: NURSE PRACTITIONER

## 2020-07-21 PROCEDURE — 96125 COGNITIVE TEST BY HC PRO: CPT

## 2020-07-21 RX ADMIN — LEVOTHYROXINE SODIUM 100 MCG: 100 TABLET ORAL at 06:11

## 2020-07-21 RX ADMIN — CEPHALEXIN 500 MG: 500 CAPSULE ORAL at 20:06

## 2020-07-21 RX ADMIN — METFORMIN HYDROCHLORIDE 1000 MG: 500 TABLET ORAL at 08:19

## 2020-07-21 RX ADMIN — MIRTAZAPINE 15 MG: 15 TABLET, ORALLY DISINTEGRATING ORAL at 20:06

## 2020-07-21 RX ADMIN — CYANOCOBALAMIN TAB 1000 MCG 1000 MCG: 1000 TAB at 08:19

## 2020-07-21 RX ADMIN — CLOBETASOL PROPIONATE: 0.5 CREAM TOPICAL at 08:20

## 2020-07-21 RX ADMIN — DIVALPROEX SODIUM 375 MG: 125 CAPSULE ORAL at 20:06

## 2020-07-21 RX ADMIN — CLOBETASOL PROPIONATE: 0.5 CREAM TOPICAL at 20:07

## 2020-07-21 RX ADMIN — FAMOTIDINE 20 MG: 10 TABLET ORAL at 08:19

## 2020-07-21 RX ADMIN — CEPHALEXIN 500 MG: 500 CAPSULE ORAL at 08:19

## 2020-07-21 RX ADMIN — METFORMIN HYDROCHLORIDE 1000 MG: 500 TABLET ORAL at 16:54

## 2020-07-21 RX ADMIN — AMLODIPINE BESYLATE 2.5 MG: 2.5 TABLET ORAL at 16:54

## 2020-07-21 RX ADMIN — HYDROCHLOROTHIAZIDE 25 MG: 25 TABLET ORAL at 08:19

## 2020-07-21 RX ADMIN — RISPERIDONE 0.5 MG: 0.5 TABLET ORAL at 08:19

## 2020-07-21 RX ADMIN — VALSARTAN 160 MG: 160 TABLET, FILM COATED ORAL at 08:19

## 2020-07-21 RX ADMIN — FAMOTIDINE 20 MG: 10 TABLET ORAL at 20:05

## 2020-07-21 ASSESSMENT — PAIN SCALES - GENERAL: PAINLEVEL_OUTOF10: 0

## 2020-07-21 NOTE — PLAN OF CARE
Problem: Altered Mood, Deterioration in Function:  Goal: Ability to perform activities of daily living will improve  Description: Ability to perform activities of daily living will improve  7/21/2020 0934 by Manolo Quarles RN  Outcome: Ongoing  7/20/2020 2144 by Katerine Gupta RN  Outcome: Ongoing  Goal: Maintenance of adequate nutrition will improve  Description: Maintenance of adequate nutrition will improve  7/21/2020 0934 by Manolo Quarles RN  Outcome: Ongoing  7/20/2020 2144 by Katerine Gupta RN  Outcome: Ongoing  Goal: Participates in care planning  Description: Participates in care planning  7/21/2020 [address] by Manolo Quarles RN  Outcome: Ongoing  7/20/2020 2144 by Katerine Gupta RN  Outcome: Ongoing  Goal: Patient specific goal  Description: Patient specific goal  7/21/2020 0934 by Manolo Quarles RN  Outcome: Ongoing  7/20/2020 2144 by Katerine Gupta RN  Outcome: Ongoing

## 2020-07-21 NOTE — GROUP NOTE
Group Therapy Note    Date: 7/21/2020    Group Start Time: 2041  Group End Time: 1600    Number of Participants: 4/5    Type: Exercise/Recreation Group    Group Topic/Objective: Chair Exercises    Notes:  Pt encouraged to attend, pt declined.      Discipline Responsible: Certified Therapeutic Recreation Specialist     Electronically signed by Edgard Russell [place] on 7/21/2020 at 4:09 PM

## 2020-07-21 NOTE — GROUP NOTE
Group Therapy Note    Date: 7/21/2020    Group Start Time: 1400  Group End Time: 1430  Group Topic: Group Documentation/ Psychotherapy    Alena Gu, MSW, LSW    Group Therapy Note    Attendees: 3/6       Patient's Goal: \"Have some water\"    Notes: Patient was offered group. Declined to participate and remained in her room during the session.      Discipline Responsible: /Counselor    Signature: Patria Kanner, MSW, LSKELY

## 2020-07-21 NOTE — BH NOTE
After patient's events of being upset earlier, her daughter Ban Alanis was contacted with an update to inform her of what her mothers behaviors and statements had been and also to let her know that her mother had received PRN medications. She expressed appreciation for the update. Reviewed patients life history in the fact that when the patient was young and dating her , she returned from a date, and the patients mother had experienced a miscarriage and told the patient \"you've killed your brother\". Ban Alanis explained that the patient has always had a way of manipulating arguments when she felt upset by saying negative things such as \"I wish you had never been born\" to the children, or \"I may as well be dead\". States that with her growing up in [facility], and the culture which the patient grew up in. Ban Alanis voices she does not believe her mother would every do anything to end her life. Ban Alanis also states that recently her mother was heard saying she was planning on not eating to \"show them\". All the conversation, Ban Alanis shared information that her mother manipulates to control situations. Dr. Cristina Carrasco updated. Dr. Cristina Carrasco gave new orders for Depakote sprinkles 125 mg increased to 3 caps starting tomorrow. Order placed in chart.

## 2020-07-21 NOTE — CARE COORDINATION
11:15 am- Obtained verbal consent from Eneida Alvarez patient's POA for Voluntary Admission. Spoke with Cherelle Watkins about placement. She would like a referral sent to [facility]. 11:45am- [facility] ([phone]) and spoke with Kenneth Montenegro. She provided fax# [phone] for referral and transferred call to [facility]. Left message for [facility] with referral information and faxed referral. Awaiting determination.

## 2020-07-21 NOTE — GROUP NOTE
Group Therapy Note    Date: 7/21/2020    Group Start Time: 0830  Group End Time: 0930    Number of Participants: 6/6    Type: Morning Goals Group/ Community Meeting    Group Topic/Objective: Set Goal For The Day and to review Unit Rules and Regulations. Patient's Goal:  Pt states her goal for today is \"Have some water. \"    Notes:  Pt presented with blunted affect during the group. Pt stats she is feeling \"fine\" and is grateful for \"food. \"  Pt reports restful sleep of ~12 hours. Depression (0-10): 0    Anxiety (0-10): 0    Irritability/Aggitation (0-10): 0    Status After Intervention:  Improved    Participation Level:  Active Listener and Interactive    Participation Quality: Appropriate, Attentive and Sharing    Speech:  normal    Thought Process/Content: Logical    Affective Functioning: Blunted    Mood: Blunted    Level of consciousness:  Alert and Attentive    Response to Learning: Able to verbalize current knowledge/experience    Endings: None Reported    Modes of Intervention: Education, Support and Socialization    Discipline Responsible: Certified Therapeutic Recreation Specialist     Electronically signed by Brent Goyal MA on 7/21/2020 at 9:51 AM

## 2020-07-21 NOTE — PROGRESS NOTES
Pt pleasant and cooperative this shift, med compliant, came out for all meals and groups. Ate well this shift for all meals. BS wnl, no c/o voiced. Out in tv area quite a bit today, watching television and socializing with other ladies on unit.
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
## 2020-07-21 NOTE — PLAN OF CARE
Problem: Altered Mood, Deterioration in Function:  Goal: Ability to perform activities of daily living will improve  Description: Ability to perform activities of daily living will improve  7/20/2020 2144 by Aleksandra Kendall RN  Outcome: Ongoing  7/20/2020 1400 by Aleksandra Kendall RN  Outcome: Ongoing  Goal: Maintenance of adequate nutrition will improve  Description: Maintenance of adequate nutrition will improve  7/20/2020 2144 by Aleksandra Kendall RN  Outcome: Ongoing  7/20/2020 1400 by Aleksandra Kendall RN  Outcome: Ongoing  Goal: Participates in care planning  Description: Participates in care planning  7/20/2020 2144 by Aleksandra Kendall RN  Outcome: Ongoing  7/20/2020 1400 by Aleksandra Kendall RN  Outcome: Ongoing  Goal: Patient specific goal  Description: Patient specific goal  7/20/2020 2144 by Aleksandra Kendall RN  Outcome: Ongoing  7/20/2020 1400 by Aleksandra Kendall RN  Outcome: Ongoing

## 2020-07-21 NOTE — PROGRESS NOTES
Speech Language Pathology  Facility/Department: [facility]  Initial Speech/Language/Cognitive Assessment    NAME: Donnie Aguilar  :    MRN: [id number]  ADMISSION DATE: 2020  ADMITTING DIAGNOSIS: has Subcortical vascular dementia with behavioral disturbance ([facility] [address] 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[facility] [address] Diabetes mellitus ([facility] [address] Hyperlipidemia; Hypertension; Thyroid disease; and Late onset Alzheimer's disease with behavioral disturbance ([facility] [address] on their problem list.  DATE ONSET: prior to admission    Date of Eval: 2020   Evaluating Therapist: Pito Lowe MS, CCC-SLP   RECENT RESULTS  CT OF HEAD/MRI:20- No acute intracranial abnormality. Primary Complaint: unable to report complaint    Pain:  Pain Assessment  Pain Assessment: 0-10  Pain Level: 0  Patient's Stated Pain Goal: No pain    Subjective:  Previous level of function and limitations: pt unable to report but did state she lived at home with her . Current level of function appears to be slightly below her baseline, although condition appears to have been declining based on chart review. Objective:   Cognition: Donnie Aguilar participated in administration of the Mini-Mental State Examination (MMSE) to assess cognitive functioning. She was oriented to self only and continues to talk about fixed delusion of \"that man\". She appeared more comfortable with ST this date as compared to yesterday when she became very agitated resulting in discontinuation of testing. Today she also appeared to be quoting previously heard scripts as responses to some questions. On the MMSE pt scored a 3/30. She did repeat 1/3 unrelated objects when asked for immediate recall. No carryover for delayed recall. She named 1 real-life object when prompted. She followed a 1-step command when this command was written down. However, she perseverated on this command (\"close your eyes\") for the remainder of the evaluation.  Results of the MMSE reveal a severe cognitive impairment and indicate need for 24-hour supervision and assistance with ADLs. ST to follow 1x/wk for ongoing assessment and intervention when appropriate. Pt will likely benefit from 24-hour care and speech therapy re-evaluation at next placement. Her performance today was likely impacted by her new environment, unfamiliar clinician, and fixed delusion/ongoing fear. When in a comfortable environment, her cognitive function may improve. Assessment:  Cognitive Diagnosis: pt with severe cognitive impairment. Oriented to self only. Impaired working memory, object naming, short-term memory, comprehension         Recommendations:  Requires SLP Intervention: Yes  Duration/Frequency of Treatment: 1x/wk for LOS  D/C Recommendations: 24 hour supervision/assistance       Plan:   Goals:  Short-term Goals  Timeframe for Short-term Goals: LOS  Goal 1: Pt will participate in ongoing cognitive assessment with intervention and goals adjusted as able  Long-term Goals  Timeframe for Long-term Goals: LOS  Goal 1: Pt will participate in ongoing cognitive assessment with intervention and goals adjusted as able   Patient/family involved in developing goals and treatment plan: pt, RN      General  Chart Reviewed: Yes  Patient assessed for rehabilitation services?: Yes  Family / Caregiver Present: No  Social/Functional History  Lives With: Spouse             Oral/Motor  Oral Motor: (unable to assess)    Auditory Comprehension  Comprehension: Exceptions  Yes/No Questions: Severe  Basic Questions: Severe  Complex Questions: Severe  One Step Basic Commands: Severe  Two Step Basic Commands: Severe  Multistep Basic Commands: Severe  Common Objects: Moderate  Pictures: Moderate  Conversation: Severe  Interfering Components: Working memory         Expression  Primary Mode of Expression: Verbal              Motor Speech  Motor Speech:  Within Functional Limits            Orientation  Overall Orientation Status: Impaired  Orientation Level: Oriented to person  Memory  Memory: Exceptions to [facility]  Short-term Memory: Severe  Working Memory: Severe  Problem Solving  Problem Solving: Exceptions to [facility]  Simple Functional Tasks: Severe    Additional Assessments:             Prognosis:  Speech Therapy Prognosis  Prognosis: Guarded  Prognosis Considerations:  Other (Comment)(cognitive deficits)  Individuals consulted  Consulted and agree with results and recommendations: Patient;RN    Education:  Patient Education: no evidence of learning  Patient Education Response: No evidence of learning          Therapy Time:   Individual Concurrent Group Co-treatment   Time In 1210         Time Out 1227         Minutes 17            Timed Code Treatment Minutes: 17 Minutes  Total Treatment Time: [address] [facility] Luis Roxie  7/21/2020 12:48 PM

## 2020-07-22 LAB
GLUCOSE BLD-MCNC: 136 MG/DL (ref 70–99)
GLUCOSE BLD-MCNC: 92 MG/DL (ref 70–99)

## 2020-07-22 PROCEDURE — 6370000000 HC RX 637 (ALT 250 FOR IP): Performed by: NURSE PRACTITIONER

## 2020-07-22 PROCEDURE — 6370000000 HC RX 637 (ALT 250 FOR IP): Performed by: INTERNAL MEDICINE

## 2020-07-22 PROCEDURE — 6370000000 HC RX 637 (ALT 250 FOR IP): Performed by: PSYCHIATRY & NEUROLOGY

## 2020-07-22 PROCEDURE — 1240000000 HC EMOTIONAL WELLNESS R&B

## 2020-07-22 PROCEDURE — 99233 SBSQ HOSP IP/OBS HIGH 50: CPT | Performed by: NURSE PRACTITIONER

## 2020-07-22 PROCEDURE — 82962 GLUCOSE BLOOD TEST: CPT

## 2020-07-22 RX ADMIN — CYANOCOBALAMIN TAB 1000 MCG 1000 MCG: 1000 TAB at 08:48

## 2020-07-22 RX ADMIN — FAMOTIDINE 20 MG: 10 TABLET ORAL at 08:48

## 2020-07-22 RX ADMIN — RISPERIDONE 0.5 MG: 0.5 TABLET ORAL at 08:48

## 2020-07-22 RX ADMIN — MIRTAZAPINE 15 MG: 15 TABLET, ORALLY DISINTEGRATING ORAL at 20:47

## 2020-07-22 RX ADMIN — LEVOTHYROXINE SODIUM 100 MCG: 100 TABLET ORAL at 08:50

## 2020-07-22 RX ADMIN — CEPHALEXIN 500 MG: 500 CAPSULE ORAL at 20:47

## 2020-07-22 RX ADMIN — CEPHALEXIN 500 MG: 500 CAPSULE ORAL at 08:48

## 2020-07-22 RX ADMIN — AMLODIPINE BESYLATE 2.5 MG: 2.5 TABLET ORAL at 17:02

## 2020-07-22 RX ADMIN — VALSARTAN 160 MG: 160 TABLET, FILM COATED ORAL at 08:48

## 2020-07-22 RX ADMIN — HYDROCHLOROTHIAZIDE 25 MG: 25 TABLET ORAL at 08:48

## 2020-07-22 RX ADMIN — METFORMIN HYDROCHLORIDE 1000 MG: 500 TABLET ORAL at 17:02

## 2020-07-22 RX ADMIN — FAMOTIDINE 20 MG: 10 TABLET ORAL at 20:47

## 2020-07-22 RX ADMIN — DIVALPROEX SODIUM 375 MG: 125 CAPSULE ORAL at 20:47

## 2020-07-22 RX ADMIN — CLOBETASOL PROPIONATE: 0.5 CREAM TOPICAL at 20:48

## 2020-07-22 RX ADMIN — METFORMIN HYDROCHLORIDE 1000 MG: 500 TABLET ORAL at 08:48

## 2020-07-22 RX ADMIN — CLOBETASOL PROPIONATE: 0.5 CREAM TOPICAL at 08:48

## 2020-07-22 ASSESSMENT — PAIN SCALES - GENERAL: PAINLEVEL_OUTOF10: 0

## 2020-07-22 NOTE — PROGRESS NOTES
Spoke to pt daughter at length about her day. Informed daughter pt participated in groups including playing [employer] with another pt. Also that pt had come out for all meals today and ate fairly well throughout the day and had a shower after dinner. Daughter stated [facility] staff had provided excellent care to pt and she was thrilled that she was participating in all activities and eating much better than at home. Stated that she is welcome to call her  if she wishes, but he didn't want to pressure her to do so.
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
## 2020-07-22 NOTE — GROUP NOTE
Group Therapy Note    Date: 7/22/2020    Group Start Time: 2442  Group End Time: 1130  Group Topic: Recreational    [address] JAMES Padilla        Group Therapy Note    Attendees: 4/5       Notes:  Pts participated in recreational therapy group; Relaxation Through Music. Pts were each allowed to pick a song they could listen to that will help them relax. Pts were encouraged to relax and socialize as the music was playing. Pt participated actively in the group. Pt noted to struggle with recalling words to song and would often blame therapist for changing the words. Pt also noted to be poor historian AEB at times reporting she went to an [employer] concert as a child, then later reporting her son took her to an [employer], and then also reporting she was friends with Ignacio and would be backstage at all his concerts. Status After Intervention:  Improved    Participation Level:  Active Listener and Interactive    Participation Quality: Appropriate, Attentive and Sharing      Speech:  normal      Thought Process/Content: Delusional      Affective Functioning: Congruent      Mood: Bright      Level of consciousness:  Alert and Attentive      Response to Learning: Able to verbalize current knowledge/experience and Able to verbalize/acknowledge new learning      Endings: None Reported    Modes of Intervention: Socialization and Activity      Discipline Responsible: Certified Therapeutic Recreation Specialist       Signature:  Kaylie Caballero, [address] [place] [address]

## 2020-07-22 NOTE — BH NOTE
Pt social in day room at beginning of shift. Alert to name only. Denies anxiety, depression or SI. Medications taken without difficulty. Refused to change into pajamas, assisted to bed and slept all shift.

## 2020-07-22 NOTE — GROUP NOTE
Group Therapy Note    Date: 7/22/2020    Group Start Time: 0830  Group End Time: 0900    Number of Participants: 5/5    Type: Morning Goals Group/ Community Meeting    Group Topic/Objective: Set Goal For The Day and to review Unit Rules and Regulations. Patient's Goal:  Pt states her goal is \"I'd like to go to bed. \"    Notes:  Pt presented with blunted affect during group. Pt states she is feeling \"fine\" and is grateful for her family. Pt reports restful sleep, but is unsure how long she slept. Depression (0-10): 0    Anxiety (0-10): 0    Irritability/Aggitation (0-10): 0    Status After Intervention:  Improved    Participation Level:  Active Listener and Interactive    Participation Quality: Appropriate, Attentive and Sharing    Speech:  normal    Thought Process/Content: Logical    Affective Functioning: Blunted    Mood: Blunted    Level of consciousness:  Alert and Attentive    Response to Learning: Able to verbalize current knowledge/experience    Endings: None Reported    Modes of Intervention: Education, Support and Socialization    Discipline Responsible: Certified Therapeutic Recreation Specialist     Electronically signed by Ratna Valero MA on 7/22/2020 at 9:36 AM

## 2020-07-22 NOTE — PLAN OF CARE
Problem: Altered Mood, Deterioration in Function:  Goal: Ability to perform activities of daily living will improve  Description: Ability to perform activities of daily living will improve  7/21/2020 2314 by Zhanna Bryant RN  Outcome: Ongoing  7/21/2020 0934 by Yarelis Ramirez RN  Outcome: Ongoing  Goal: Maintenance of adequate nutrition will improve  Description: Maintenance of adequate nutrition will improve  7/21/2020 2314 by Zhanna Bryant RN  Outcome: Ongoing  7/21/2020 0934 by Yarelis Ramirez RN  Outcome: Ongoing  Goal: Participates in care planning  Description: Participates in care planning  7/21/2020 2314 by Zhanna Bryant RN  Outcome: Ongoing  7/21/2020 0934 by Yarelis Ramirez RN  Outcome: Ongoing  Goal: Patient specific goal  Description: Patient specific goal  7/21/2020 2314 by Zhanna Bryant RN  Outcome: Ongoing  7/21/2020 0934 by Yarelis Ramirez RN  Outcome: Ongoing     Problem: Nutrition Deficit:  Goal: Ability to achieve adequate nutritional intake will improve  Description: Ability to achieve adequate nutritional intake will improve  7/21/2020 2314 by Zhanna Bryant RN  Outcome: Ongoing  7/21/2020 0934 by Yarelis Ramirez RN  Outcome: Ongoing

## 2020-07-23 LAB — GLUCOSE BLD-MCNC: 156 MG/DL (ref 70–99)

## 2020-07-23 PROCEDURE — U0002 COVID-19 LAB TEST NON-CDC: HCPCS

## 2020-07-23 PROCEDURE — 82962 GLUCOSE BLOOD TEST: CPT

## 2020-07-23 PROCEDURE — 6370000000 HC RX 637 (ALT 250 FOR IP): Performed by: NURSE PRACTITIONER

## 2020-07-23 PROCEDURE — 1240000000 HC EMOTIONAL WELLNESS R&B

## 2020-07-23 PROCEDURE — 6370000000 HC RX 637 (ALT 250 FOR IP): Performed by: INTERNAL MEDICINE

## 2020-07-23 PROCEDURE — 99233 SBSQ HOSP IP/OBS HIGH 50: CPT | Performed by: NURSE PRACTITIONER

## 2020-07-23 PROCEDURE — 6370000000 HC RX 637 (ALT 250 FOR IP): Performed by: PSYCHIATRY & NEUROLOGY

## 2020-07-23 RX ADMIN — FAMOTIDINE 20 MG: 10 TABLET ORAL at 20:50

## 2020-07-23 RX ADMIN — CEPHALEXIN 500 MG: 500 CAPSULE ORAL at 08:04

## 2020-07-23 RX ADMIN — HYDROCHLOROTHIAZIDE 25 MG: 25 TABLET ORAL at 08:07

## 2020-07-23 RX ADMIN — RISPERIDONE 0.5 MG: 0.5 TABLET ORAL at 08:07

## 2020-07-23 RX ADMIN — MIRTAZAPINE 15 MG: 15 TABLET, ORALLY DISINTEGRATING ORAL at 20:48

## 2020-07-23 RX ADMIN — TRAZODONE HYDROCHLORIDE 50 MG: 50 TABLET ORAL at 20:48

## 2020-07-23 RX ADMIN — LEVOTHYROXINE SODIUM 100 MCG: 100 TABLET ORAL at 08:07

## 2020-07-23 RX ADMIN — DIVALPROEX SODIUM 375 MG: 125 CAPSULE ORAL at 20:50

## 2020-07-23 RX ADMIN — BISACODYL 5 MG: 5 TABLET, COATED ORAL at 08:17

## 2020-07-23 RX ADMIN — METFORMIN HYDROCHLORIDE 1000 MG: 500 TABLET ORAL at 08:04

## 2020-07-23 RX ADMIN — VALSARTAN 160 MG: 160 TABLET, FILM COATED ORAL at 08:05

## 2020-07-23 RX ADMIN — CLOBETASOL PROPIONATE: 0.5 CREAM TOPICAL at 08:07

## 2020-07-23 RX ADMIN — FAMOTIDINE 20 MG: 10 TABLET ORAL at 08:07

## 2020-07-23 RX ADMIN — METFORMIN HYDROCHLORIDE 1000 MG: 500 TABLET ORAL at 17:14

## 2020-07-23 RX ADMIN — CEPHALEXIN 500 MG: 500 CAPSULE ORAL at 20:48

## 2020-07-23 RX ADMIN — CYANOCOBALAMIN TAB 1000 MCG 1000 MCG: 1000 TAB at 08:07

## 2020-07-23 RX ADMIN — CLOBETASOL PROPIONATE: 0.5 CREAM TOPICAL at 20:50

## 2020-07-23 ASSESSMENT — PAIN SCALES - GENERAL: PAINLEVEL_OUTOF10: 0

## 2020-07-23 NOTE — PROGRESS NOTES
Patient is selectively social, out on the unit, patient is pleasant, patient denies anxiety and depression but tearful during group. Patient can isolate to room at times. Patient needed to be reminded to use silverware and not her hands during lunch. No other issues at this time.

## 2020-07-23 NOTE — PLAN OF CARE
[address] [place] [address] Interdisciplinary Treatment Plan Note     Review Date & Time: 07/23/20 0830    Admission Type:   Admission Type:  Voluntary     Reason for admission:  Reason for Admission: Dementia with behavioral disturbance    Patient Diagnosis: Subcortical vascular dementia with behavioral disturbance (HCC) (Chronic)       PATIENT STRENGTHS:  Patient Strengths:Strengths: Connection to output provider, Positive Support, Medication Compliance, Social Skills  Patient Strengths and Limitations:Limitations: Perceives need for assistance with self-care, Unrealistic self-view  Addictive Behavior:Addictive Behavior  In the past 3 months, have you felt or has someone told you that you have a problem with:  : None  Do you have a history of Chemical Use?: No  Do you have a history of Alcohol Use?: No  Do you have a history of Street Drug Abuse?: No  Histroy of Prescripton Drug Abuse?: No  Medical Problems:   Past Medical History:   Diagnosis Date    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HCC)     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HCC)     Cancer ([facility] [address]     left breast    Diabetes mellitus ([facility]     Hyperlipidemia     Hypertension     Thyroid disease        Risk:  Fall RiskTotal: 80  Sagar Scale Sagar Scale Score: 21  BVC Total: 1    Status EXAM:   Status and Exam  Normal: Yes  Facial Expression: (blunted)  Affect: Blunt  Level of Consciousness: Alert  Mood:Normal: Yes  Mood: Depressed  Motor Activity:Normal: Yes  Motor Activity: Decreased  Interview Behavior: Cooperative  Preception: [place] to Person  Attention:Normal: Yes  Attention: Unable to Concentrate  Thought Processes: Loose Assoc., Flt.of Ideas  Thought Content:Normal: No  Thought Content: Poverty of Content  Hallucinations: None  Delusions: No  Delusions: Obsessions  Memory:Normal: No  Memory: Poor Recent, Poor Remote, Confabulation  Insight and Judgment: No  Insight and Judgment: Poor Judgment, Poor Insight, Unrealistic  Present Suicidal Ideation: No  Present Homicidal Ideation: No    Daily Assessment Last Entry:   Daily Sleep (WDL): Within Defined Limits  Patient Currently in Pain: No  Daily Nutrition (WDL): Within Defined Limits  Appetite Change: Normal for patient  Barriers to Nutrition: Blood sugar control, Cognitive impairment  Level of Assistance: Independent/Self    Patient Monitoring:  Frequency of Checks: 4 times per hour, close    Psychiatric Symptoms:   Depression Symptoms  Depression Symptoms: No problems reported or observed. Anxiety Symptoms  Anxiety Symptoms: No problems reported or observed. Ninoska Symptoms  Ninoska Symptoms: No problems reported or observed. Psychosis Symptoms  Delusion Type: No problems reported or observed.     Suicide Risk CSSR-S:  1) Within the past month, have you wished you were dead or wished you could go to sleep and not wake up? : No  2) Have you actually had any thoughts of killing yourself? : No  6) Have you ever done anything, started to do anything, or prepared to do anything to end your life?: No    EDUCATION:   Learner Progress Toward Treatment Goals: Reviewed goals and plan of care    Method: Group    Outcome: No evidence of Learning    PATIENT GOALS: Med titration, compliance with unit programming    PLAN/TREATMENT RECOMMENDATIONS UPDATE: Med titration    Estimated Length of Stay Update:  4 days   Estimated Discharge Date Update: 04/27/20  Discharge Criteria: Med titration      SHORT-TERM GOALS UPDATE:  Time frame for Short-Term Goals: 4 days     LONG-TERM GOALS UPDATE:  Time frame for Long-Term Goals: 4 days   Members Present in Team Meeting: See Signature Sheet    Osvaldo Meneses MSW, LSW

## 2020-07-23 NOTE — GROUP NOTE
Group Therapy Note    Date: 7/23/2020    Group Start Time: 9398  Group End Time: 1100    Number of Participants: 4/5    Type: Spirituality    Group Topic/Objective: Religous discussion with [employer]. Notes:  Pt noted to attend group. Status After Intervention:  Improved    Participation Level:  Active Listener and Interactive    Participation Quality: Appropriate, Attentive and Sharing    Speech:  normal    Thought Process/Content: Confused    Affective Functioning: Congruent    Mood: Bright    Level of consciousness:  Alert and Attentive    Response to Learning: Able to verbalize current knowledge/experience and Able to verbalize/acknowledge new learning    Endings: None Reported    Modes of Intervention: Education, Support and Socialization    Discipline Responsible: Certified Therapeutic Recreation Specialist     Electronically signed by Erlin Calhoun MA on 7/23/2020 at 11:19 AM

## 2020-07-23 NOTE — GROUP NOTE
Group Therapy Note    Date: 7/23/2020    Group Start Time: 0830  Group End Time: 0900    Number of Participants: 3/5    Type: Morning Goals Group/ Community Meeting    Group Topic/Objective: Set Goal For The Day and to review Unit Rules and Regulations. Patient's Goal:  Pt states her goal is \"Look at my  for once. \"    Notes:  Pt participated actively in the group. Pt noted to struggle with open-ended questions. If they were changed to yes/no or either/or pt was able to answer majority of questions. Pt states she is feeling \"not to bad\" and is grateful \"to go home. \"  Pt reports restful sleep of ~6 hours.      Depression (0-10): 0    Anxiety (0-10): 0    Irritability/Aggitation (0-10): 0    Status After Intervention:  Improved    Participation Level: Interactive    Participation Quality: Appropriate, Attentive and Sharing    Speech:  normal    Thought Process/Content: Confused    Affective Functioning: Congruent    Mood: Bright    Level of consciousness:  Alert and Attentive    Response to Learning: Able to verbalize current knowledge/experience    Endings: None Reported    Modes of Intervention: Education, Support and Socialization    Discipline Responsible: Certified Therapeutic Recreation Specialist     Electronically signed by Conor Alvarez MA on 7/23/2020 at 9:23 AM
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
## 2020-07-23 NOTE — GROUP NOTE
Group Therapy Note    Date: 7/22/2020    Group Start Time: 2183  Group End Time: 5700    Number of Participants: 2/5    Type: Exercise/Recreation Group    Group Topic/Objective: Cornhole and Chair Exercises     Notes:  Pt arrived ~10 minutes late to group. Pt with Max encouragement, pt engaged in cornhole activity and chair exercises. Pt expressed enjoyment in group at the end.      Status After Intervention:  Improved    Participation Level: Interactive; with prompting    Participation Quality: Attentive and Sharing    Speech:  normal    Thought Process/Content: Confused    Affective Functioning: Congruent    Mood: Bright    Level of consciousness:  Alert    Response to Learning: Able to verbalize current knowledge/experience and Able to verbalize/acknowledge new learning    Endings: None Reported    Modes of Intervention: Activity and Movement    Discipline Responsible: Certified Therapeutic Recreation Specialist     Electronically signed by Reji Felix MA on 7/23/2020 at 9:32 AM

## 2020-07-23 NOTE — PLAN OF CARE
Problem: Altered Mood, Deterioration in Function:  Goal: Ability to perform activities of daily living will improve  Description: Ability to perform activities of daily living will improve  7/23/2020 0106 by Yeimi Gresham RN  Outcome: Ongoing  7/22/2020 1514 by Kerry Lopez RN  Outcome: Ongoing  Goal: Maintenance of adequate nutrition will improve  Description: Maintenance of adequate nutrition will improve  7/23/2020 0106 by Yeimi Gresham RN  Outcome: Ongoing  7/22/2020 1514 by Kerry Lopez RN  Outcome: Ongoing  Goal: Participates in care planning  Description: Participates in care planning  7/23/2020 0106 by Yeimi Gresham RN  Outcome: Ongoing  7/22/2020 1514 by Kerry Lopez RN  Outcome: Ongoing  Goal: Patient specific goal  Description: Patient specific goal  7/23/2020 0106 by Yeimi Gresham RN  Outcome: Ongoing  7/22/2020 1514 by Kerry Lopez RN  Outcome: Ongoing     Problem: Nutrition Deficit:  Goal: Ability to achieve adequate nutritional intake will improve  Description: Ability to achieve adequate nutritional intake will improve  7/23/2020 0106 by Yeimi Gresham RN  Outcome: Ongoing  7/22/2020 1514 by Kerry Lopez RN  Outcome: Ongoing

## 2020-07-23 NOTE — CARE COORDINATION
[facility] ([phone]) to check on status of patient acceptance. Voicemail left requesting update and check what COVID standard they are following. LSW awaits call back. 12:15 PM  LSW called Freddie Luis ([phone]) to inquire on status of [facility] as they have not returned calls. Freddie Luis gave LSW another name of Elisha Torres to try and stated she is waiting to hear back as well. LSW then called [facility] again and spoke with Owen Da Silva as Bishop Camejo was out. Bernardino Schulte states that they are waiting on paperwork from the family, but they need patient's COVID results. Bernardino Schulte also stated that patient's apartment will not be ready until Monday or Tuesday. LSW to update family and fax COVID results. 12:45 PM  LSW checked patient chart with patient's nurse Stephy Rollins and could not find the 2nd COVID test.  LSW faxed the first negative results to [facility] at [phone] and informed Imelda Thomson NP that another COVID would need to be done. LSW also called Freddie Luis and updated her on all.

## 2020-07-23 NOTE — PROGRESS NOTES
Pt compliant with medications this evening, pleasant, confused alert to self but easily redirected. Pt denies thoughts to harm self or others and denies audio/visual hallucinations. Pt observed to rest through night, no complaints voiced.

## 2020-07-24 ENCOUNTER — APPOINTMENT (OUTPATIENT)
Dept: MRI IMAGING | Age: 75
DRG: 884 | End: 2020-07-24
Attending: PSYCHIATRY & NEUROLOGY
Payer: MEDICARE

## 2020-07-24 LAB
GLUCOSE BLD-MCNC: 119 MG/DL (ref 70–99)
GLUCOSE BLD-MCNC: 139 MG/DL (ref 70–99)

## 2020-07-24 PROCEDURE — 6370000000 HC RX 637 (ALT 250 FOR IP): Performed by: PSYCHIATRY & NEUROLOGY

## 2020-07-24 PROCEDURE — 6370000000 HC RX 637 (ALT 250 FOR IP): Performed by: INTERNAL MEDICINE

## 2020-07-24 PROCEDURE — 82962 GLUCOSE BLOOD TEST: CPT

## 2020-07-24 PROCEDURE — 99233 SBSQ HOSP IP/OBS HIGH 50: CPT | Performed by: NURSE PRACTITIONER

## 2020-07-24 PROCEDURE — 6370000000 HC RX 637 (ALT 250 FOR IP): Performed by: NURSE PRACTITIONER

## 2020-07-24 PROCEDURE — 1240000000 HC EMOTIONAL WELLNESS R&B

## 2020-07-24 RX ORDER — POLYETHYLENE GLYCOL 3350 17 G/17G
17 POWDER, FOR SOLUTION ORAL DAILY PRN
Qty: 527 G | Refills: 1 | Status: SHIPPED | OUTPATIENT
Start: 2020-07-24 | End: 2020-08-23

## 2020-07-24 RX ORDER — RISPERIDONE 0.5 MG/1
0.5 TABLET, FILM COATED ORAL DAILY
Qty: 60 TABLET | Refills: 3 | Status: SHIPPED | OUTPATIENT
Start: 2020-07-25

## 2020-07-24 RX ORDER — MIRTAZAPINE 15 MG/1
15 TABLET, ORALLY DISINTEGRATING ORAL NIGHTLY
Qty: 30 TABLET | Refills: 3 | Status: SHIPPED | OUTPATIENT
Start: 2020-07-24

## 2020-07-24 RX ORDER — NICOTINE POLACRILEX 4 MG
15 LOZENGE BUCCAL PRN
Qty: 45 G | Refills: 1 | Status: SHIPPED | OUTPATIENT
Start: 2020-07-24

## 2020-07-24 RX ORDER — LEVOTHYROXINE SODIUM 0.1 MG/1
100 TABLET ORAL
Qty: 30 TABLET | Refills: 3 | Status: SHIPPED | OUTPATIENT
Start: 2020-07-25

## 2020-07-24 RX ORDER — DIVALPROEX SODIUM 125 MG/1
375 CAPSULE, COATED PELLETS ORAL NIGHTLY
Qty: 60 CAPSULE | Refills: 3 | Status: SHIPPED | OUTPATIENT
Start: 2020-07-24

## 2020-07-24 RX ORDER — TRAZODONE HYDROCHLORIDE 50 MG/1
50 TABLET ORAL NIGHTLY PRN
Qty: 30 TABLET | Refills: 2 | Status: SHIPPED | OUTPATIENT
Start: 2020-07-24

## 2020-07-24 RX ADMIN — HYDROCHLOROTHIAZIDE 25 MG: 25 TABLET ORAL at 08:20

## 2020-07-24 RX ADMIN — RISPERIDONE 0.5 MG: 0.5 TABLET ORAL at 08:21

## 2020-07-24 RX ADMIN — DIVALPROEX SODIUM 375 MG: 125 CAPSULE ORAL at 20:17

## 2020-07-24 RX ADMIN — CYANOCOBALAMIN TAB 1000 MCG 1000 MCG: 1000 TAB at 08:21

## 2020-07-24 RX ADMIN — FAMOTIDINE 20 MG: 10 TABLET ORAL at 08:21

## 2020-07-24 RX ADMIN — LEVOTHYROXINE SODIUM 100 MCG: 100 TABLET ORAL at 08:57

## 2020-07-24 RX ADMIN — CLOBETASOL PROPIONATE: 0.5 CREAM TOPICAL at 20:19

## 2020-07-24 RX ADMIN — METFORMIN HYDROCHLORIDE 1000 MG: 500 TABLET ORAL at 08:20

## 2020-07-24 RX ADMIN — CLOBETASOL PROPIONATE: 0.5 CREAM TOPICAL at 08:55

## 2020-07-24 RX ADMIN — CEPHALEXIN 500 MG: 500 CAPSULE ORAL at 20:17

## 2020-07-24 RX ADMIN — VALSARTAN 160 MG: 160 TABLET, FILM COATED ORAL at 08:21

## 2020-07-24 RX ADMIN — FAMOTIDINE 20 MG: 10 TABLET ORAL at 20:21

## 2020-07-24 RX ADMIN — CEPHALEXIN 500 MG: 500 CAPSULE ORAL at 08:20

## 2020-07-24 RX ADMIN — MIRTAZAPINE 15 MG: 15 TABLET, ORALLY DISINTEGRATING ORAL at 20:17

## 2020-07-24 RX ADMIN — METFORMIN HYDROCHLORIDE 1000 MG: 500 TABLET ORAL at 17:00

## 2020-07-24 RX ADMIN — TRAZODONE HYDROCHLORIDE 50 MG: 50 TABLET ORAL at 20:17

## 2020-07-24 ASSESSMENT — PAIN SCALES - GENERAL
PAINLEVEL_OUTOF10: 0
PAINLEVEL_OUTOF10: 0
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
## 2020-07-24 NOTE — PLAN OF CARE
Problem: Altered Mood, Deterioration in Function:  Goal: Ability to perform activities of daily living will improve  Description: Ability to perform activities of daily living will improve  7/23/2020 2247 by Donavan Martinez RN  Outcome: Ongoing  7/23/2020 1952 by Donavan Martinez RN  Outcome: Ongoing  Goal: Maintenance of adequate nutrition will improve  Description: Maintenance of adequate nutrition will improve  7/23/2020 2247 by Donavan Martinez RN  Outcome: Ongoing  7/23/2020 1952 by Donavan Martinez RN  Outcome: Ongoing  Goal: Participates in care planning  Description: Participates in care planning  7/23/2020 2247 by Donavan Martinez RN  Outcome: Ongoing  7/23/2020 1952 by Donavan Martinez RN  Outcome: Ongoing  Goal: Patient specific goal  Description: Patient specific goal  7/23/2020 2247 by Donavan Martinez RN  Outcome: Ongoing  7/23/2020 1952 by Donavan Martinez RN  Outcome: Ongoing     Problem: Nutrition Deficit:  Goal: Ability to achieve adequate nutritional intake will improve  Description: Ability to achieve adequate nutritional intake will improve  7/23/2020 2247 by Donavan Martinez RN  Outcome: Ongoing  7/23/2020 1952 by Donavan Martinez RN  Outcome: Ongoing

## 2020-07-24 NOTE — GROUP NOTE
Group Therapy Note    Date: 7/24/2020    Group Start Time: 1100  Group End Time: 5399  Group Topic: Psychoeducation    [address]    Ronald Vidal, [place], [place]        Group Therapy Note    Attendees: 5/5       Notes:  Pts participated in recreational therapy group; [employer]. Pts and therapist utilized various activities to reminisce about their past and share stories. Purpose was to encourage positive thinking and socialization. Pt participated in the group, but noted to be confused and struggled to participate appropriately. When prompted to engage in conversation, pt able to state that she had been to the zoo, but never able to directly answer the questions. Status After Intervention:  Improved    Participation Level: Interactive    Participation Quality: Sharing      Speech:  normal      Thought Process/Content: Confused      Affective Functioning: Congruent      Mood: Bright      Level of consciousness:  Alert and Attentive      Response to Learning: Pt struggled to demonstrate response to learning d/t confusion.        Endings: None Reported    Modes of Intervention: Support, Socialization, Exploration and Activity      Discipline Responsible: Certified Therapeutic Recreation Specialist       Signature:  Reji Rene, [address] [place], [place] [address]

## 2020-07-24 NOTE — PROGRESS NOTES
Hospitalist Progress Note         Admit Date: 7/16/2020    PCP: Misa Knutson     No chief complaint on file. Assessment and Plan:      Subcortical vascular dementia with behavioral disturbance (HCC)   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HCC) CBC is normal   Diabetes mellitus ([facility] [address] continue metformin   Hyperlipidemia on Zetia.  Hypertension continue Norvasc, Diovan/hydrochlorothiazide.  Hypothyroidism change Synthroid dose to 75 MCG. TSH 0.038.  Late onset Alzheimer's disease with behavioral disturbance ([facility] [address]    Psych issues management as per psychiatry. VTE prophylaxis LMWH.     Current Facility-Administered Medications   Medication Dose Route Frequency Provider Last Rate Last Dose    divalproex (DEPAKOTE SPRINKLE) capsule 375 mg  375 mg Oral Nightly Rejeana Corns, DO   375 mg at 07/23/20 2050    mirtazapine (REMERON SOL-TAB) disintegrating tablet 15 mg  15 mg Oral Nightly Antonio Distad, APRN - CNP   15 mg at 07/23/20 2048    risperiDONE (RISPERDAL) tablet 0.5 mg  0.5 mg Oral Daily Antonio Distad, APRN - CNP   0.5 mg at 07/24/20 4427    clobetasol (TEMOVATE) 0.05 % cream   Topical BID Antonio Distad, APRN - CNP        vitamin B-12 (CYANOCOBALAMIN) tablet 1,000 mcg  1,000 mcg Oral Daily Antonio Distad, APRN - CNP   1,000 mcg at 07/24/20 1167    amLODIPine (NORVASC) tablet 2.5 mg  2.5 mg Oral QPM Antonio Distad, APRN - CNP   2.5 mg at 07/22/20 1702    famotidine (PEPCID) tablet 20 mg  20 mg Oral BID Antonio Distad, APRN - CNP   20 mg at 07/24/20 8944    levothyroxine (SYNTHROID) tablet 100 mcg  100 mcg Oral QAM AC Antonio Distad, APRN - CNP   100 mcg at 07/24/20 0857    metFORMIN (GLUCOPHAGE) tablet 1,000 mg  1,000 mg Oral BID WC aRdha García MD   1,000 mg at 07/24/20 1700    valsartan (DIOVAN) tablet 160 mg  160 mg Oral Daily Radha García MD   160 mg at 07/24/20 2278    And    hydroCHLOROthiazide (HYDRODIURIL) tablet 25 mg  25 mg Oral Daily Radha García MD   25 mg at 07/24/20 0820    acetaminophen (TYLENOL) tablet 650 mg  650 mg Oral Q4H PRN Lova Beat, DO        traZODone (DESYREL) tablet 50 mg  50 mg Oral Nightly PRN Lova Beat, DO   50 mg at 07/23/20 2048    polyethylene glycol (GLYCOLAX) packet 17 g  17 g Oral Daily PRN Lova Beat, DO        LORazepam (ATIVAN) tablet 1 mg  1 mg Oral Q4H PRN Lova Beat, DO        Or    LORazepam (ATIVAN) injection 1 mg  1 mg Intramuscular Q4H PRN Lova Beat, DO        haloperidol lactate (HALDOL) injection 5 mg  5 mg Intramuscular Q4H PRN Lova Beat, DO        Or    haloperidol (HALDOL) tablet 5 mg  5 mg Oral Q4H PRN Lova Beat, DO   5 mg at 07/20/20 1936    acetaminophen (TYLENOL) tablet 500 mg  500 mg Oral Q4H PRN Lova Beat, DO        acetaminophen (TYLENOL) tablet 325 mg  325 mg Oral Q4H PRN Lova Beat, DO        diphenhydrAMINE (BENADRYL) capsule 25 mg  25 mg Oral Q6H PRN Lova Beat, DO   25 mg at 07/20/20 1936    diphenhydrAMINE (BENADRYL) injection 25 mg  25 mg Intravenous Q6H PRN Lova Beat, DO        bisacodyl (DULCOLAX) EC tablet 5 mg  5 mg Oral Daily PRN Jonathan Minder, APRN - CNP   5 mg at 07/23/20 0817    glucose (GLUTOSE) 40 % oral gel 15 g  15 g Oral PRN Laura Sabillon MD        dextrose 50 % IV solution  12.5 g Intravenous PRN Laura Sabillon MD        glucagon (rDNA) injection 1 mg  1 mg Intramuscular PRN Laura Sabillon MD        dextrose 5 % solution 1,000 mL  1,000 mL Intravenous PRN Laura Sabillon MD        cephALEXin (KEFLEX) capsule 500 mg  500 mg Oral 2 times per day Laura Sabillon MD   500 mg at 07/24/20 0820       Subjective:     Patient denied any new complaints. No acute overnight events    Objective:        Intake/Output Summary (Last 24 hours) at 7/24/2020 1733  Last data filed at 7/24/2020 1211  Gross per 24 hour   Intake 0 ml   Output --   Net 0 ml      Vitals:   Vitals:    07/24/20 0800   BP: (!) 126/94   Pulse: 86   Resp: and/or weight based adjustment of the mA/kV was utilized to reduce the radiation dose to as low as reasonably achievable. COMPARISON: None. HISTORY: ORDERING SYSTEM PROVIDED HISTORY: delirium TECHNOLOGIST PROVIDED HISTORY: Reason for exam:->delirium Has a \"code stroke\" or \"stroke alert\" been called? ->No Reason for Exam: delirium Acuity: Unknown Type of Exam: Initial Relevant Medical/Surgical History: hx Dementia FINDINGS: BRAIN/VENTRICLES: There is no acute intracranial hemorrhage, mass effect or midline shift. No abnormal extra-axial fluid collection. The gray-white differentiation is maintained without acute infarct. Left frontal encephalomalacia compatible with prior infarction. There is prominence of the ventricles and sulci due to global parenchymal volume loss. There are nonspecific areas of hypoattenuation within the periventricular and subcortical white matter, which likely represent chronic microvascular ischemic change. Intracranial atherosclerotic disease. ORBITS: The visualized portion of the orbits demonstrate no acute abnormality. SINUSES: The visualized paranasal sinuses and mastoid air cells demonstrate no acute abnormality. SOFT TISSUES/SKULL: No acute abnormality of the visualized skull or soft tissues. No acute intracranial abnormality.            [facility]

## 2020-07-24 NOTE — GROUP NOTE
Group Therapy Note    Date: 7/24/2020    Group Start Time: 0845  Group End Time: 1806    Number of Participants: 4/5    Type: Morning Goals Group/ Community Meeting    Group Topic/Objective: Set Goal For The Day and to review Unit Rules and Regulations. Patient's Goal:  Pt states her goal is \"To go home. \"    Notes:  Pt presented as confused during group. Pt struggled to answer questions. With prompting, pt able to answer yes/no questions. Pt states she is feeling \"not good. \"  Pt unable to state what she is grateful for and unable to rate her emotions. Pt reports restless sleep and is unsure how long she slept. Depression (0-10): 0    Anxiety (0-10): Pt endorsed anxiety, but unable to rate. Irritability/Aggitation (0-10): Pt endorsed irritability, but unable to rate.      Status After Intervention:  Unchanged    Participation Level: Minimal    Participation Quality: Disorganized    Speech:  hesitant    Thought Process/Content: Confused    Affective Functioning: Blunted    Mood: Blunted    Level of consciousness:  Preoccupied    Response to Learning: Able to verbalize current knowledge/experience    Endings: None Reported    Modes of Intervention: Education, Support and Socialization    Discipline Responsible: Certified Therapeutic Recreation Specialist     Electronically signed by Reji De Santiago MA on 7/24/2020 at 9:38 AM
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
## 2020-07-24 NOTE — PROGRESS NOTES
Patient seems more depressed today than yesterday, even though she denies depression or anxiety. Patient is not social with peers today and isolates to room often. Patient is encouraged to go to groups. Patient was able to talk to sister from [facility] and was excited about that. Patient could not remember sisters name after she hung up with her. Patient is encouraged to eat and drink fluids.

## 2020-07-25 LAB
GLUCOSE BLD-MCNC: 126 MG/DL (ref 70–99)
GLUCOSE BLD-MCNC: 87 MG/DL (ref 70–99)
SARS-COV-2: NOT DETECTED
SOURCE: NORMAL

## 2020-07-25 PROCEDURE — 82962 GLUCOSE BLOOD TEST: CPT

## 2020-07-25 PROCEDURE — 6370000000 HC RX 637 (ALT 250 FOR IP): Performed by: NURSE PRACTITIONER

## 2020-07-25 PROCEDURE — 6370000000 HC RX 637 (ALT 250 FOR IP): Performed by: INTERNAL MEDICINE

## 2020-07-25 PROCEDURE — 6370000000 HC RX 637 (ALT 250 FOR IP): Performed by: PSYCHIATRY & NEUROLOGY

## 2020-07-25 PROCEDURE — 1240000000 HC EMOTIONAL WELLNESS R&B

## 2020-07-25 PROCEDURE — 99222 1ST HOSP IP/OBS MODERATE 55: CPT | Performed by: PSYCHIATRY & NEUROLOGY

## 2020-07-25 RX ADMIN — TRAZODONE HYDROCHLORIDE 50 MG: 50 TABLET ORAL at 21:32

## 2020-07-25 RX ADMIN — CEPHALEXIN 500 MG: 500 CAPSULE ORAL at 08:47

## 2020-07-25 RX ADMIN — VALSARTAN 160 MG: 160 TABLET, FILM COATED ORAL at 08:47

## 2020-07-25 RX ADMIN — RISPERIDONE 0.5 MG: 0.5 TABLET ORAL at 08:47

## 2020-07-25 RX ADMIN — AMLODIPINE BESYLATE 2.5 MG: 2.5 TABLET ORAL at 17:00

## 2020-07-25 RX ADMIN — METFORMIN HYDROCHLORIDE 1000 MG: 500 TABLET ORAL at 17:00

## 2020-07-25 RX ADMIN — LEVOTHYROXINE SODIUM 100 MCG: 100 TABLET ORAL at 08:47

## 2020-07-25 RX ADMIN — CYANOCOBALAMIN TAB 1000 MCG 1000 MCG: 1000 TAB at 08:47

## 2020-07-25 RX ADMIN — FAMOTIDINE 20 MG: 10 TABLET ORAL at 21:32

## 2020-07-25 RX ADMIN — DIVALPROEX SODIUM 375 MG: 125 CAPSULE ORAL at 21:32

## 2020-07-25 RX ADMIN — MIRTAZAPINE 15 MG: 15 TABLET, ORALLY DISINTEGRATING ORAL at 21:32

## 2020-07-25 RX ADMIN — FAMOTIDINE 20 MG: 10 TABLET ORAL at 08:47

## 2020-07-25 RX ADMIN — CLOBETASOL PROPIONATE: 0.5 CREAM TOPICAL at 21:32

## 2020-07-25 RX ADMIN — METFORMIN HYDROCHLORIDE 1000 MG: 500 TABLET ORAL at 08:47

## 2020-07-25 RX ADMIN — HYDROCHLOROTHIAZIDE 25 MG: 25 TABLET ORAL at 08:47

## 2020-07-25 RX ADMIN — CLOBETASOL PROPIONATE: 0.5 CREAM TOPICAL at 08:47

## 2020-07-25 ASSESSMENT — PAIN SCALES - GENERAL: PAINLEVEL_OUTOF10: 0

## 2020-07-25 NOTE — BH NOTE
Irvin Cardoso spent time in her room this evening. She was polite on approach and cooperative with vitals. Medication was reviewed and she was compliant. She denied feeling depressed and stated, \"What I feel is abandoned\". She denied SI and stated that she just wants to go home. She reported to this writer that her sister and father were coming to pick her up tomorrow. This writer explained that they were in [facility] and she argued that they were here. She had a small snack and looked at her book. Will continue to monitor for safety.

## 2020-07-25 NOTE — BH NOTE
Group Therapy Note    Date: 7/24/2020  Start Time: 1930  End Time:  2000  Number of Participants: 1      Type of Group: Nursing Education      Notes:  Reviewed medication with Hattie Jerez. She acknowledged understanding but due to cognitive issues she is unable to retain teaching and changes the subject. Status After Intervention:  Unchanged    Participation Level:  Active Listener and Interactive    Participation Quality: Appropriate, Attentive and Sharing      Speech:  normal      Thought Process/Content: Dementia, confused      Affective Functioning: Blunted      Mood: euthymic      Level of consciousness:  Alert and Attentive      Response to Learning: Progressing to goal      Endings: None Reported    Modes of Intervention: Education      Discipline Responsible: Registered Nurse      Signature:  Tami Melara RN

## 2020-07-25 NOTE — GROUP NOTE
Group Therapy Note    Date: 7/25/2020    Group Start Time: 1115  Group End Time: 1200  Group Topic: Psychoeducation    Baldev [address]., MSW, KARLYW        Group Therapy Note    Attendees: 3/6     Patient's Goal:  Toss and Talk Ball    Notes:  Patient was active and participated in group. Status After Intervention:  Improved    Participation Level:  Active Listener and Interactive    Participation Quality: Appropriate, Attentive, Sharing and Supportive    Speech:  normal    Thought Process/Content: Linear    Affective Functioning: Congruent    Mood: elevated    Level of consciousness:  Alert and Attentive    Response to Learning: Capable of insight    Endings: None Reported    Modes of Intervention: Support, Socialization and Activity    Discipline Responsible: /Counselor      Signature:  JENARO Colon, HALEY

## 2020-07-25 NOTE — PLAN OF CARE
Problem: Altered Mood, Deterioration in Function:  Goal: Ability to perform activities of daily living will improve  Description: Ability to perform activities of daily living will improve  7/25/2020 1014 by Lena Waddell RN  Outcome: Ongoing  7/24/2020 2228 by Quinton Donald RN  Outcome: Ongoing  Goal: Maintenance of adequate nutrition will improve  Description: Maintenance of adequate nutrition will improve  7/25/2020 1014 by Lena Waddell RN  Outcome: Ongoing  7/24/2020 2228 by Quinton Donlad RN  Outcome: Ongoing  Goal: Participates in care planning  Description: Participates in care planning  7/25/2020 1014 by Lena Waddell RN  Outcome: Ongoing  7/24/2020 2228 by Quinton Donald RN  Outcome: Ongoing  Goal: Patient specific goal  Description: Patient specific goal  7/25/2020 1014 by Lena Waddell RN  Outcome: Ongoing  7/24/2020 2228 by Quinotn Donald RN  Outcome: Ongoing

## 2020-07-25 NOTE — GROUP NOTE
Group Therapy Note    Date: 7/25/2020    Group Start Time: 1332  Group End Time: 0915  Group Topic: [facility]    JENARO Pena LSW        Group Therapy Note    Attendees: 6/6     Patient's Goal:  \"Do more\"    Notes:  Patient was active and participated in group. Patient stated she got \"not enough, restless\" sleep and is grateful for \"my  picking me up\". Patient stated she is feeling \"fine\" and rated her depression as \"0\", anxiety as \"0\", and irritability as \"0\".     Status After Intervention:  Unchanged    Participation Level: Interactive    Participation Quality: Appropriate    Speech:  normal    Thought Process/Content: Logical    Affective Functioning: Congruent    Mood: unchanged    Level of consciousness:  Alert and Attentive    Response to Learning: Capable of insight    Endings: None Reported    Modes of Intervention: Support and Clarifying    Discipline Responsible: /Counselor      Signature:  JENARO Pena LSW
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
## 2020-07-25 NOTE — PLAN OF CARE
Problem: Altered Mood, Deterioration in Function:  Goal: Ability to perform activities of daily living will improve  Description: Ability to perform activities of daily living will improve  7/24/2020 2228 by Alban Chang RN  Outcome: Ongoing  7/24/2020 1017 by Luci Jordan RN  Outcome: Ongoing  Goal: Maintenance of adequate nutrition will improve  Description: Maintenance of adequate nutrition will improve  7/24/2020 2228 by Alban Chang RN  Outcome: Ongoing  7/24/2020 1017 by Luci Jordan RN  Outcome: Ongoing  Goal: Participates in care planning  Description: Participates in care planning  7/24/2020 2228 by Alban Chang RN  Outcome: Ongoing  7/24/2020 1017 by Luci Jordan RN  Outcome: Ongoing  Goal: Patient specific goal  Description: Patient specific goal  7/24/2020 2228 by lAban Chang RN  Outcome: Ongoing  7/24/2020 1017 by Lcui Jordan RN  Outcome: Ongoing     Problem: Nutrition Deficit:  Goal: Ability to achieve adequate nutritional intake will improve  Description: Ability to achieve adequate nutritional intake will improve  7/24/2020 2228 by Alban Chang RN  Outcome: Ongoing  7/24/2020 1017 by Luci Jordan RN  Outcome: Ongoing

## 2020-07-26 LAB
GLUCOSE BLD-MCNC: 108 MG/DL (ref 70–99)
GLUCOSE BLD-MCNC: 127 MG/DL (ref 70–99)

## 2020-07-26 PROCEDURE — 6370000000 HC RX 637 (ALT 250 FOR IP): Performed by: PSYCHIATRY & NEUROLOGY

## 2020-07-26 PROCEDURE — 6370000000 HC RX 637 (ALT 250 FOR IP): Performed by: INTERNAL MEDICINE

## 2020-07-26 PROCEDURE — 6370000000 HC RX 637 (ALT 250 FOR IP): Performed by: NURSE PRACTITIONER

## 2020-07-26 PROCEDURE — 1240000000 HC EMOTIONAL WELLNESS R&B

## 2020-07-26 PROCEDURE — 82962 GLUCOSE BLOOD TEST: CPT

## 2020-07-26 PROCEDURE — 99232 SBSQ HOSP IP/OBS MODERATE 35: CPT | Performed by: PSYCHIATRY & NEUROLOGY

## 2020-07-26 RX ADMIN — DIVALPROEX SODIUM 375 MG: 125 CAPSULE ORAL at 20:32

## 2020-07-26 RX ADMIN — METFORMIN HYDROCHLORIDE 1000 MG: 500 TABLET ORAL at 17:00

## 2020-07-26 RX ADMIN — RISPERIDONE 0.5 MG: 0.5 TABLET ORAL at 08:17

## 2020-07-26 RX ADMIN — MIRTAZAPINE 15 MG: 15 TABLET, ORALLY DISINTEGRATING ORAL at 20:32

## 2020-07-26 RX ADMIN — AMLODIPINE BESYLATE 2.5 MG: 2.5 TABLET ORAL at 17:00

## 2020-07-26 RX ADMIN — FAMOTIDINE 20 MG: 10 TABLET ORAL at 08:17

## 2020-07-26 RX ADMIN — CLOBETASOL PROPIONATE: 0.5 CREAM TOPICAL at 20:32

## 2020-07-26 RX ADMIN — CLOBETASOL PROPIONATE: 0.5 CREAM TOPICAL at 08:17

## 2020-07-26 RX ADMIN — LEVOTHYROXINE SODIUM 100 MCG: 100 TABLET ORAL at 06:31

## 2020-07-26 RX ADMIN — FAMOTIDINE 20 MG: 10 TABLET ORAL at 20:33

## 2020-07-26 RX ADMIN — CYANOCOBALAMIN TAB 1000 MCG 1000 MCG: 1000 TAB at 08:17

## 2020-07-26 RX ADMIN — ACETAMINOPHEN 650 MG: 325 TABLET ORAL at 20:32

## 2020-07-26 RX ADMIN — METFORMIN HYDROCHLORIDE 1000 MG: 500 TABLET ORAL at 08:17

## 2020-07-26 ASSESSMENT — PAIN SCALES - GENERAL
PAINLEVEL_OUTOF10: 8
PAINLEVEL_OUTOF10: 4
PAINLEVEL_OUTOF10: 0
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
## 2020-07-26 NOTE — PLAN OF CARE
Problem: Altered Mood, Deterioration in Function:  Goal: Ability to perform activities of daily living will improve  Description: Ability to perform activities of daily living will improve  7/25/2020 2345 by Kaushal Pérez RN  Outcome: Ongoing  7/25/2020 1014 by Jonh Guerrero RN  Outcome: Ongoing  Goal: Maintenance of adequate nutrition will improve  Description: Maintenance of adequate nutrition will improve  7/25/2020 2345 by Kaushal Pérez RN  Outcome: Ongoing  7/25/2020 1014 by Jonh Guerrero RN  Outcome: Ongoing  Goal: Participates in care planning  Description: Participates in care planning  7/25/2020 2345 by Kaushal Pérez RN  Outcome: Ongoing  7/25/2020 1014 by Jonh Guerrero RN  Outcome: Ongoing  Goal: Patient specific goal  Description: Patient specific goal  7/25/2020 2345 by Kaushal Pérez RN  Outcome: Ongoing  7/25/2020 1014 by Jonh Guerrero RN  Outcome: Ongoing     Problem: Nutrition Deficit:  Goal: Ability to achieve adequate nutritional intake will improve  Description: Ability to achieve adequate nutritional intake will improve  7/25/2020 2345 by Kaushal Pérez RN  Outcome: Ongoing  7/25/2020 1014 by Jonh Guerrero RN  Outcome: Ongoing

## 2020-07-26 NOTE — BH NOTE
Ele Hamilton was visible on the milieu. She was social with female peer. She was polite on approach and cooperative with vitals. She rated depression and anxiety as 0/10. However, at times she did appear somewhat anxious and sad. She had a tearful episode as we spoke. When asked what she was grateful for she stated, \"I don't have anything wrong with me now. \" She was asked to identify three positives for the day. She stated, \"My friend was worried and I tried not to get discouraged. \" Another positive was that she spoke with her granddaughter on the phone. She stated that her granddaughter \"sees children's eyes all over her desk. \" She could not identify a third positive so this writer reminded her that she had talked with her sister and 80year old father who live in [facility]. She denied talking with them and began talking about this sister and accusing her of taking her to see a man who said that he was going to take out her brain. At times she seems to confuse this sister (Ben) with her daughter, Ban Alanis, who brought her to the hospital and will not let her return home. She is very angry with Ban Alanis. She tends to ruminate on the man who said that he was going to take her brain and stated that she is terrified of him. She was reassured that he did not take her brain or she would not be here talking with this writer. That seemed to satisfy her at that time. She was compliant with medication. Has slept well through the night. Will continue to monitor for safety.

## 2020-07-26 NOTE — GROUP NOTE
Group Therapy Note    Date: 7/26/2020    Group Start Time: 0830  Group End Time: 0900  Group Topic: [address] [place] [address]    JENARO Wang LSW        Group Therapy Note    Attendees: 6/6     Patient's Goal:  \"To go to the wrap-up group\"    Notes:  Patient was active and participated in group. Patient stated she feels \"okay\" and slept \"enough, restful\" last night. Patient rated her depression as \"5\", anxiety as \"0\", and irritability as \"0\" and is grateful for \"my oatmeal\".     Status After Intervention:  Unchanged    Participation Level: Interactive    Participation Quality: Appropriate    Speech:  normal    Thought Process/Content: Linear    Affective Functioning: Congruent    Mood: unchanged    Level of consciousness:  Alert and Attentive    Response to Learning: Capable of insight    Endings: None Reported    Modes of Intervention: Support and Clarifying    Discipline Responsible: /Counselor      Signature:  JENARO Wang LSW

## 2020-07-26 NOTE — CARE COORDINATION
Both negative COVID results sent to [facility], attn: Anitra Kurtz, via fax to [phone].

## 2020-07-26 NOTE — GROUP NOTE
Group Therapy Note    Date: 7/26/2020    Group Start Time: 1115  Group End Time: 1200  Group Topic: Psychoeducation    Baldev Jacques 79., MSW, KARLYW        Group Therapy Note    Attendees: 4/6    Patient's Goal:  Reminiscence group. Notes:  Patient was active and participated in group. Status After Intervention:  Improved    Participation Level:  Active Listener and Interactive    Participation Quality: Appropriate, Attentive and Sharing    Speech:  normal    Thought Process/Content: Linear    Affective Functioning: Congruent    Mood: elevated    Level of consciousness:  Alert and Attentive    Response to Learning: Able to verbalize current knowledge/experience and Capable of insight    Endings: None Reported    Modes of Intervention: Socialization    Discipline Responsible: /Counselor      Signature:  JENARO Moore LSW

## 2020-07-26 NOTE — BH NOTE
Group Therapy Note    Date: 7/25/2020  Start Time: 1930  End Time:  2000    Number of Participants: 1    Type of Group: Nursing Education    Notes:  Reviewed medication with Dawna Gutierrez, who voiced understanding. However, due to her cognitive decline she does not fully grasp the teaching. Attempted to educate on coping skills keeping it simple. She had a tearful episode,  comfort  And support given. Status After Intervention:  Unchanged    Participation Level:  Active Listener and Interactive    Participation Quality: Appropriate and Attentive      Speech:  normal      Thought Process/Content: Confused      Affective Functioning: Congruent      Mood: anxious and depressed      Level of consciousness:  Alert and Attentive      Response to Learning: Progressing to goal      Endings: None Reported    Modes of Intervention: Education      Discipline Responsible: Registered Nurse      Signature:  Cassy Worthy RN

## 2020-07-27 LAB
GLUCOSE BLD-MCNC: 107 MG/DL (ref 70–99)
GLUCOSE BLD-MCNC: 149 MG/DL (ref 70–99)

## 2020-07-27 PROCEDURE — 6370000000 HC RX 637 (ALT 250 FOR IP): Performed by: PSYCHIATRY & NEUROLOGY

## 2020-07-27 PROCEDURE — 6370000000 HC RX 637 (ALT 250 FOR IP): Performed by: NURSE PRACTITIONER

## 2020-07-27 PROCEDURE — 1240000000 HC EMOTIONAL WELLNESS R&B

## 2020-07-27 PROCEDURE — 82962 GLUCOSE BLOOD TEST: CPT

## 2020-07-27 PROCEDURE — 99233 SBSQ HOSP IP/OBS HIGH 50: CPT | Performed by: NURSE PRACTITIONER

## 2020-07-27 RX ADMIN — CLOBETASOL PROPIONATE: 0.5 CREAM TOPICAL at 08:25

## 2020-07-27 RX ADMIN — DIVALPROEX SODIUM 375 MG: 125 CAPSULE ORAL at 20:30

## 2020-07-27 RX ADMIN — MIRTAZAPINE 15 MG: 15 TABLET, ORALLY DISINTEGRATING ORAL at 20:30

## 2020-07-27 RX ADMIN — FAMOTIDINE 20 MG: 10 TABLET ORAL at 20:31

## 2020-07-27 RX ADMIN — CYANOCOBALAMIN TAB 1000 MCG 1000 MCG: 1000 TAB at 08:26

## 2020-07-27 RX ADMIN — FAMOTIDINE 20 MG: 10 TABLET ORAL at 08:27

## 2020-07-27 RX ADMIN — TRAZODONE HYDROCHLORIDE 50 MG: 50 TABLET ORAL at 20:30

## 2020-07-27 RX ADMIN — LEVOTHYROXINE SODIUM 100 MCG: 100 TABLET ORAL at 06:31

## 2020-07-27 RX ADMIN — RISPERIDONE 0.5 MG: 0.5 TABLET ORAL at 08:26

## 2020-07-27 RX ADMIN — CLOBETASOL PROPIONATE: 0.5 CREAM TOPICAL at 20:30

## 2020-07-27 ASSESSMENT — PAIN SCALES - GENERAL
PAINLEVEL_OUTOF10: 0

## 2020-07-27 NOTE — GROUP NOTE
Group Therapy Note    Date: 7/27/2020    Group Start Time: 1486  Group End Time: 0194    Number of Participants: 8/8    Type: Morning Goals Group/ Community Meeting    Group Topic/Objective: Set Goal For The Day and to review Unit Rules and Regulations. Patient's Goal:  Pt states her goal is \"I'd like to have a car. \"    Notes:  Pt presented as pleasant and cooperative during group. Pt able to answer majority of questions appropriately. Pt states she is feeling \"cold\" and is grateful for \"living. \"  Pt reports sleep was restful and she got ~1 hour. Depression (0-10): Pt endorsed, but unable to rate. Anxiety (0-10): 0    Irritability/Aggitation (0-10): 0    Status After Intervention:  Improved    Participation Level:  Active Listener and Interactive    Participation Quality: Appropriate, Attentive and Sharing    Speech:  normal    Thought Process/Content: Confused    Affective Functioning: Congruent    Mood: Bright    Level of consciousness:  Alert and Attentive    Response to Learning: Able to verbalize current knowledge/experience    Endings: None Reported    Modes of Intervention: Education, Support and Socialization    Discipline Responsible: Certified Therapeutic Recreation Specialist     Electronically signed by Johnny Keane MA on 7/27/2020 at 10:21 AM

## 2020-07-27 NOTE — GROUP NOTE
Group Therapy Note    Date: 7/24/2020    Group Start Time: 7398  Group End Time: 1600    Number of Participants: 4/5    Type: Exercise/Recreation Group    Group Topic/Objective: Chair Exercises    Notes:  Pt attended group, but struggled to follow along. Pt unable to process 1-step directions or demonstration. Status After Intervention:  Unchanged    Participation Level: Minimal    Participation Quality: Inappropriate    Speech:  normal    Thought Process/Content: Confused    Affective Functioning: Congruent    Mood: Bright    Level of consciousness:  Preoccupied    Response to Learning: Pt unable to demonstrate response to learning at this time.      Endings: None Reported    Modes of Intervention: Activity and Movement    Discipline Responsible:  Certified Therapeutic Recreation Specialist     Electronically signed by Pili Amin MA on 7/27/2020 at 11:34 AM

## 2020-07-27 NOTE — GROUP NOTE
Group Therapy Note    Date: 7/27/2020    Group Start Time: 9743  Group End Time: 1600    Number of Participants: 3/6    Type: Exercise/Recreation Group    Group Topic/Objective: Chair Exercises    Notes:  Pt attended group, but noted to struggle to participate. Pt unable to focus for long and often times noted to be confused when given 1-step instructions and demonstration. Status After Intervention:  Unchanged    Participation Level: Minimal    Participation Quality: Pt struggled to focus.     Speech:  normal    Thought Process/Content: Confused    Affective Functioning: Congruent    Mood: Bright    Level of consciousness:  Preoccupied and Inattentive    Response to Learning: Able to verbalize current knowledge/experience    Endings: None Reported    Modes of Intervention: Activity and Movement    Discipline Responsible: Certified Therapeutic Recreation Specialist     Electronically signed by PETE Vazquez MA on 7/28/2020 at 9:13 AM

## 2020-07-27 NOTE — BH NOTE
Patient has had a good day today. Pleasant, directable and no overt distress noted. She had been interacting well with peers, even noted to be singing with one of them. Dr. Héctor Galo notified of low blood pressure readings and blood pressure medication held this AM and Norvasc discontinued this PM per Dr. Jose Anderson order. Daughter (RYLEY) Craig Del Angel notified of med change and approved. Free from falls. Safety monitoring will continue.

## 2020-07-27 NOTE — BH NOTE
Aimee Sierra was visible on the milieu. She was social with peers and spent time watching TV and talking with peers. She was polite and cooperative. Denied depression, anxiety and SI. Continues to c/o a pain on the top of her head which she denied having a headache. She was medication compliant. At times she is still worried and wants to go home but she is less obsessed about having to go home because she has things to do. Her affect is less tense now. She has slept well tonight. Will continue to monitor for safety.
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
## 2020-07-27 NOTE — PLAN OF CARE
Problem: Altered Mood, Deterioration in Function:  Goal: Ability to perform activities of daily living will improve  Description: Ability to perform activities of daily living will improve  7/26/2020 2306 by Melissa Lemus RN  Outcome: Ongoing  7/26/2020 1007 by Yarelis Ramirez RN  Outcome: Ongoing  Goal: Maintenance of adequate nutrition will improve  Description: Maintenance of adequate nutrition will improve  7/26/2020 2306 by Melissa Lemus RN  Outcome: Ongoing  7/26/2020 1007 by Yarelis Ramirez RN  Outcome: Ongoing  Goal: Participates in care planning  Description: Participates in care planning  7/26/2020 2306 by Melissa Lemus RN  Outcome: Ongoing  7/26/2020 1007 by Yarelis Ramirez RN  Outcome: Ongoing  Goal: Patient specific goal  Description: Patient specific goal  7/26/2020 2306 by Melissa Lemus RN  Outcome: Ongoing  7/26/2020 1007 by Yarelis Ramirez RN  Outcome: Ongoing     Problem: Nutrition Deficit:  Goal: Ability to achieve adequate nutritional intake will improve  Description: Ability to achieve adequate nutritional intake will improve  7/26/2020 2306 by Melissa Lemus RN  Outcome: Ongoing  7/26/2020 1007 by Yarelis Ramirez RN  Outcome: Ongoing

## 2020-07-27 NOTE — PLAN OF CARE
Problem: Altered Mood, Deterioration in Function:  Goal: Ability to perform activities of daily living will improve  Description: Ability to perform activities of daily living will improve  Outcome: Ongoing  Goal: Maintenance of adequate nutrition will improve  Description: Maintenance of adequate nutrition will improve  Outcome: Ongoing  Goal: Participates in care planning  Description: Participates in care planning  Outcome: Ongoing  Goal: Patient specific goal  Description: Patient specific goal  Outcome: Ongoing

## 2020-07-27 NOTE — GROUP NOTE
Group Therapy Note    Date: 7/27/2020    Group Start Time: 1030  Group End Time: 1125  Group Topic: Psychoeducation    [address] [place], [place]        Group Therapy Note    Attendees: 6/8       Notes:  Pts participated in recreational therapy group; Theme Song Quiz. Pts were given various tv show theme songs from the 62s. As they listened they had to guess the show the song came from. Purpose was to encourage reminiscence discussion. Pt attended group, but struggled to participate. Pt's comments were always off topic and pt did respond to attempts for reality orientation. For example; when discussing the television [place] pt started discussing activities outside she used to do with there mother. Therapist attempted to orient to topic by asking her favorite character, but pt continued to discuss activities she did. Status After Intervention:  Unchanged    Participation Level: Minimal    Participation Quality: Inappropriate      Speech:  normal      Thought Process/Content: Confused      Affective Functioning: Congruent      Mood: bright      Level of consciousness:  Preoccupied and Inattentive      Response to Learning: Pt unable to demonstrate response to learning at this time.        Endings: None Reported    Modes of Intervention: Support, Socialization, Exploration and Activity      Discipline Responsible: Certified Therapeutic Recreation Specialist       Signature:  Geronimo Lopez [place], [address] [facility] Tran Padilla

## 2020-07-27 NOTE — BH NOTE
Group Therapy Note    Date: 7/26/2020  Start Time: 1930  End Time:  2000  Number of Participants: 1      Type of Group: Nursing Education      Notes:  Reviewed medication with Elba Yao. She voiced understanding but due to her cognitive disability is unable to fully comprehend. Explained the importance of medication compliance after discharge. Status After Intervention:  Unchanged    Participation Level:  Active Listener and Interactive    Participation Quality: Appropriate and Attentive      Speech:  normal      Thought Process/Content: Confused      Affective Functioning: Blunted      Mood: euthymic      Level of consciousness:  Alert and Attentive      Response to Learning: Progressing to goal      Endings: None Reported    Modes of Intervention: Education      Discipline Responsible: Registered Nurse      Signature:  Maria Eugenia Rajan RN

## 2020-07-28 VITALS
SYSTOLIC BLOOD PRESSURE: 105 MMHG | WEIGHT: 120 LBS | OXYGEN SATURATION: 99 % | HEART RATE: 86 BPM | TEMPERATURE: 96.6 F | BODY MASS INDEX: 22.67 KG/M2 | RESPIRATION RATE: 16 BRPM | DIASTOLIC BLOOD PRESSURE: 62 MMHG

## 2020-07-28 PROCEDURE — 99233 SBSQ HOSP IP/OBS HIGH 50: CPT | Performed by: NURSE PRACTITIONER

## 2020-07-28 PROCEDURE — 6370000000 HC RX 637 (ALT 250 FOR IP): Performed by: NURSE PRACTITIONER

## 2020-07-28 PROCEDURE — 97129 THER IVNTJ 1ST 15 MIN: CPT

## 2020-07-28 PROCEDURE — 6370000000 HC RX 637 (ALT 250 FOR IP): Performed by: INTERNAL MEDICINE

## 2020-07-28 RX ADMIN — RISPERIDONE 0.5 MG: 0.5 TABLET ORAL at 08:31

## 2020-07-28 RX ADMIN — METFORMIN HYDROCHLORIDE 1000 MG: 500 TABLET ORAL at 17:32

## 2020-07-28 RX ADMIN — FAMOTIDINE 20 MG: 10 TABLET ORAL at 08:33

## 2020-07-28 RX ADMIN — CYANOCOBALAMIN TAB 1000 MCG 1000 MCG: 1000 TAB at 08:31

## 2020-07-28 RX ADMIN — LEVOTHYROXINE SODIUM 100 MCG: 100 TABLET ORAL at 06:33

## 2020-07-28 RX ADMIN — CLOBETASOL PROPIONATE: 0.5 CREAM TOPICAL at 08:31

## 2020-07-28 NOTE — PLAN OF CARE
Problem: Altered Mood, Deterioration in Function:  Goal: Ability to perform activities of daily living will improve  Description: Ability to perform activities of daily living will improve  7/28/2020 1026 by Jonh Guerrero RN  Outcome: Ongoing  7/27/2020 2343 by Jess Fontanez RN  Outcome: Ongoing  Goal: Maintenance of adequate nutrition will improve  Description: Maintenance of adequate nutrition will improve  7/28/2020 1026 by Jonh Guerrero RN  Outcome: Ongoing  7/27/2020 2343 by Jess Fontanez RN  Outcome: Ongoing  Goal: Participates in care planning  Description: Participates in care planning  7/28/2020 1026 by Jonh Guerrero RN  Outcome: Ongoing  7/27/2020 2343 by Jess Fontanez RN  Outcome: Ongoing  Goal: Patient specific goal  Description: Patient specific goal  7/28/2020 1026 by Jonh Guerrero RN  Outcome: Ongoing  7/27/2020 2343 by Jess Fontanez RN  Outcome: Ongoing

## 2020-07-28 NOTE — DISCHARGE INSTR - COC
Continuity of Care Form    Patient Name: Danilo Peguero   :  [birth year]  MRN:  [id number]    Admit date:  2020  Discharge date: 20    Code Status Order: [facility]   Advance Directives:   [facility] Documentation     Date/Time Healthcare Directive Type of Healthcare Directive Copy in [address] Agent's Name Healthcare Agent's Phone Number    20 1037  No, patient does not have an advance directive for healthcare treatment  Durable power of  for health care  No, copy requested from family  [address]  --    20 0308  Yes, patient has an advance directive for healthcare treatment  Durable power of  for health care;Living will  Yes, copy in chart  [address]  [phone]          Admitting Physician:  Eloise Shirley DO  PCP: Titi Gallo    Discharging Nurse: Alexus Meza Unit/Room#: 6581/2708-98  Discharging Unit Phone Number: [phone]    Emergency Contact:   Extended Emergency Contact Information  Primary Emergency Contact: [facility] Phone: [phone]  Work Phone: [phone]  Mobile Phone: [phone]  Relation: Child  Preferred language: English   needed? No  Secondary Emergency Contact: Monserrat Orozco"  Home Phone: [phone]  Relation: Spouse   needed? No    Past Surgical History:  Past Surgical History:   Procedure Laterality Date    CHOLECYSTECTOMY      HYSTERECTOMY      KNEE SURGERY      LYMPHADENECTOMY      MASTECTOMY      left side    TONSILLECTOMY      VULVECTOMY         Immunization History: There is no immunization history on file for this patient.     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    Diabetes mellitus ([facility] [address] E11.9    Hyperlipidemia E78.5    Hypertension I10    Thyroid disease E07.9    Late onset Alzheimer's disease with behavioral disturbance (HCC) G30.1, F02.81       Isolation/Infection:   Isolation          No Isolation        Patient Infection Status     Infection Onset Added Last Indicated Last Indicated By Review Planned Expiration Resolved Resolved By    None active    Resolved    COVID-19 Rule Out 07/23/20 07/24/20 07/23/20 Covid-19 Ambulatory (Ordered)   07/25/20 Rule-Out Test Resulted          Nurse Assessment:  Last Vital Signs: /62   Pulse 86   Temp 96.6 °F (35.9 °C) (Temporal)   Resp 16   Wt 120 lb (54.4 kg)   SpO2 99%   BMI 22.67 kg/m²     Last documented pain score (0-10 scale): Pain Level: 0  Last Weight:   Wt Readings from Last 1 Encounters:   07/17/20 120 lb (54.4 kg)     Mental Status:  disoriented, alert and able to concentrate and follow conversation    IV Access:  - None    Nursing Mobility/ADLs:  Walking   Independent  Transfer  Independent  Bathing  Assisted  Dressing  Assisted  [address]  Assisted  Med Delivery   whole    Wound Care Documentation and Therapy:        Elimination:  Continence:   · Bowel: Yes  · Bladder: No  Urinary Catheter: None   Colostomy/Ileostomy/Ileal Conduit: No       Date of Last BM:   No intake or output data in the 24 hours ending 07/28/20 1526  No intake/output data recorded. Safety Concerns:     None    Impairments/Disabilities:      Vision    Nutrition Therapy:  Current Nutrition Therapy:   - Oral Diet:  Carb Control 5 carbs/meal (2000kcals/day)    Routes of Feeding: Oral  Liquids: Thin Liquids  Daily Fluid Restriction: no  Last Modified Barium Swallow with Video (Video Swallowing Test): not done    Treatments at the Time of Hospital Discharge:   Respiratory Treatments:   Oxygen Therapy:  is not on home oxygen therapy.   Ventilator:    - No ventilator support    Rehab Therapies: Speech/Language Therapy  Weight Bearing Status/Restrictions: No weight bearing restirctions  Other Medical Equipment (for information only, NOT a DME order):  bath bench  Other Treatments:     Patient's personal belongings (please select all that are sent with patient):  Glasses    RN SIGNATURE:  Electronically signed by Christie Chacon RN on 7/28/20 at 3:43 PM EDT    CASE MANAGEMENT/SOCIAL WORK SECTION    Inpatient Status Date: ***    Readmission Risk Assessment Score:  Readmission Risk              Risk of Unplanned Readmission:        14           Discharging to Facility/ Agency   · Name:   · Address:  · Phone:  · Fax:    Dialysis Facility (if applicable)   · Name:  · Address:  · Dialysis Schedule:  · Phone:  · Fax:    / signature: {Esignature:[id number]}    PHYSICIAN SECTION    Prognosis: Good    Condition at Discharge: Stable    Rehab Potential (if transferring to Rehab): Good    Recommended Labs or Other Treatments After Discharge: per patient's PCP    Physician Certification: I certify the above information and transfer of Monica Parker  is necessary for the continuing treatment of the diagnosis listed and that she requires Assisted Living for greater 30 days.      Update Admission H&P: No change in H&P    PHYSICIAN SIGNATURE:  Electronically signed by RENEE Oquendo CNP on 7/28/20 at 3:27 PM EDT

## 2020-07-28 NOTE — GROUP NOTE
Group Therapy Note    Date: 7/28/2020    Group Start Time: 6484  Group End Time: 1600    Number of Participants: 5/6    Type: Exercise/Recreation Group    Group Topic/Objective: Chair Exercises     Notes:  Pt attended group and was able to complete approximately half of the exercises. Pt reported pain in side which was reported to nurse. Status After Intervention:  Improved    Participation Level:  Active Listener and Interactive    Participation Quality: Appropriate and Attentive    Speech:  normal    Thought Process/Content: Confused    Affective Functioning: Congruent    Mood: Bright    Level of consciousness:  Alert and Attentive    Response to Learning: Able to verbalize current knowledge/experience and Able to verbalize/acknowledge new learning    Endings: None Reported    Modes of Intervention: Activity and Movement    Discipline Responsible: Certified Therapeutic Recreation Speicalist     Electronically signed by PETE uDran MA on 7/28/2020 at 4:10 PM

## 2020-07-28 NOTE — DISCHARGE SUMMARY
Department of Psychiatry    Discharge Summary    Chantal Yeager  [id number]    Admission date:   7/16/2020    Discharge:   Date: 07/28/20  Location: [facility]    Inpatient Provider: Sharyle Filbert D.O. 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 Patient was able to provide informed consent and outline the risks and benefits of medications. Chantal Yeager was  compliant with medications. Pt noted a significant reduction in her depression and anxiety during her  stay on [facility]. Pt noted that she slowly improved to the point that she   was comfortable and safe to return home. Pt noted she felt her medications were  working well and denied any current side effects. Treatment team encouraged  patient to stay out of bed and try to find activities to do, verbalized  understanding. Patient reported that she felt safe on the unit and comfortable  for discharge. Pt denied suicidal/homicidal ideations, denied any problems  or concerns with medications or side effects. patient voiced progression towards  treatment goals and was offered a copy of updated treatment plan completed  during visit today. Denied any immediate needs or concerns.          Pt throughout her stay on [facility] pt felt like her medications were working and  felt comfortable being discharged on these medications. Pt was advised to take  all medications as prescribed, follow up with all scheduled appointments and  abstain from any alcohol or illicit substances. Pt was in agreement. Pt felt  safe and comfortable to be discharge and to follow up with outpt mental health. Pt was very optimistic   about her D/C and returning to her home. Pt stated that she   was doing \"good,\" today. Pt stated that she slept \"about 8hours,\" last night. Pt stated that her appetite is \"good. \"  Pt stated that she rates her depression a  \"2,\" on a scale of 0-10 with 10 being the worst and 0 being none. Pt stated  that she rates her anxiety an \"0/10,\" on the same scale. Pt denies any auditory  or visual hallucinations. Pt denied any thoughts to harm herself or anyone else. Pt felt safe and comfortable for D/C. The Pt was educated primarily by verbal means about her diagnoses and their  manifestations in her life. The option for treatment including group individual  therapy programming was offered to her and the use of medications with all their  potential risks, benefits, and side-effects were discussed with the pt at  length. Pt was given the opportunity to ask questions and she participated in the  treatment and planning process. Pt felt ready and eager to be discharged from  the from the [facility] unit. Pt felt she was safe for this disposition. Pt was considered to be able to  participate in informed consent and decision-making with respect to medical,  legal and financial issues at the time of her discharge from the [facility]. Complications: none        Treatment options, medications and alternatives reviewed with Viviana Montano and they agree with the plan to discharge. Discharge on regular  diet, continue activity as tolerated. Patient appears to be in stable condition and close to their baseline functioning.   The patient denies suicidal or homicidal ideations and is medications    mirtazapine 15 MG tablet  Commonly known as:  REMERON  Replaced by:  mirtazapine 15 MG disintegrating tablet           Where to Get Your Medications      These medications were sent to [facility]/pharmacy #[id number] Marisabel QuijanoNICOL[address]S [address] [facility] [phone]  [facility] [place] [address]    Phone:  [phone]   · bisacodyl 5 MG EC tablet  · divalproex 125 MG capsule  · glucose 40 % Gel  · levothyroxine 100 MCG tablet  · metFORMIN 1000 MG tablet  · mirtazapine 15 MG disintegrating tablet  · polyethylene glycol 17 g packet  · risperiDONE 0.5 MG tablet  · traZODone 50 MG tablet         Social History     Socioeconomic History    Marital status:      Spouse name: Not on file    Number of children: Not on file    Years of education: Not on file    Highest education level: Not on file   Occupational History    Not on file   Social Needs    Financial resource strain: Not on file    Food insecurity     Worry: Not on file     Inability: Not on file    Transportation needs     Medical: Not on file     Non-medical: Not on file   Tobacco Use    Smoking status: Never Smoker    Smokeless tobacco: Never Used   Substance and Sexual Activity    Alcohol use: Not on file    Drug use: Not on file    Sexual activity: Not on file   Lifestyle    Physical activity     Days per week: Not on file     Minutes per session: Not on file    Stress: Not on file   Relationships    Social connections     Talks on phone: Not on file     Gets together: Not on file     Attends Rastafarian service: Not on file     Active member of club or organization: Not on file     Attends meetings of clubs or organizations: Not on file     Relationship status: Not on file    Intimate partner violence     Fear of current or ex partner: Not on file     Emotionally abused: Not on file     Physically abused: Not on file     Forced sexual activity: Not on file   Other Topics Concern    Not on file   Social History Narrative    Not on file       Past Medical History:   Diagnosis Date    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[facility] [address]     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HCC)     Cancer ([facility] [address]     left breast    Diabetes mellitus ([facility] [address]     Hyperlipidemia     Hypertension     Thyroid disease       Past Surgical History:   Procedure Laterality Date    CHOLECYSTECTOMY      HYSTERECTOMY      KNEE SURGERY      LYMPHADENECTOMY      MASTECTOMY      left side    TONSILLECTOMY      VULVECTOMY        Social History     Tobacco Use    Smoking status: Never Smoker    Smokeless tobacco: Never Used   Substance Use Topics    Alcohol use: Not on file      History reviewed. No pertinent family history. Medications Prior to Admission: famotidine (PEPCID) 20 MG tablet, Take 20 mg by mouth 2 times daily BID to protect esophagus/reflux - Slade's esophagus  amLODIPine (NORVASC) 2.5 MG tablet, Take 2.5 mg by mouth every evening  clobetasol (TEMOVATE) 0.05 % cream, Apply topically 2 times daily Apply topically 2 times daily. Per daughter/POA on buttocks and around vaginal area  vitamin B-12 (CYANOCOBALAMIN) 1000 MCG tablet, Take 1,000 mcg by mouth daily  Cholecalciferol (VITAMIN D3) 50 MCG (2000 UT) CAPS, Take 25 capsules by mouth 1000 UT  valsartan-hydroCHLOROthiazide (DIOVAN-HCT) 160-25 MG per tablet, Take 1 tablet by mouth daily  [DISCONTINUED] mirtazapine (REMERON) 15 MG tablet, Take 7.5 mg by mouth nightly  [DISCONTINUED] metformin (GLUCOPHAGE) 500 MG tablet, Take 1,000 mg by mouth 2 times daily (with meals)   [DISCONTINUED] losartan (COZAAR) 25 MG tablet, Take 25 mg by mouth daily. [DISCONTINUED] exemestane (AROMASIN) 25 MG tablet, Take 25 mg by mouth daily. [DISCONTINUED] ezetimibe (ZETIA) 10 MG tablet, Take 10 mg by mouth daily. [DISCONTINUED] esomeprazole (NEXIUM) 40 MG capsule, Take 40 mg by mouth every morning (before breakfast).     [DISCONTINUED] levothyroxine (SYNTHROID) 125 MCG tablet, Take 100 mcg by mouth daily Allergies   Allergen Reactions    Aricept [Donepezil Hcl]     Molds & Smuts         he patient verbalized understanding and agreement with the above information  LEGAL STATUS ON DISCHARGE:  Voluntary    DISCHARGE DISPOSITION:  [place] at [address]:  Stable for outpatient treatment  DISCHARGE DIET:  Resume previous home diet    ACTI VITES:  As tolerated    FOLLOWUP APPOINTMENT(S):  Per aftercare summary  CONSULTS:  [facility] POA was offered and reviewed with pt:      Talked to pts poa,went over patients benefits and other matters. Reveiwed financial options /programs ect. .. CORE MEASURES  -Was the patient discharged on two or more Antipsychotics? No    -If multiple Antipsychotic medications prescribed,is tapering recommended? na    ? If yes, document plan:  ?  -If multiple Antipsychotic medications prescribed at discharge, is cross tapering in process at D/C? N/a    -Have there been 3 or more failed attempts of mono therapy? ?n/a    -If yes, list at least 3 of the failed Antipsychotic medications with the reason why each one failed: ?NA    -Was Hemoglobin A1C or fasting Glucose measure done within the last 12 months? Yes    -Lipid Panel measure done within the last 12 months?yes    -Pt was offered an FDA approved medication for Chemical Dependency: (offered refused/ordered) n/a    -Pt was offered for discharged on tobacco/nicotine cessation and/or codependency cessation via medication assistance: (offered refused/ordered)    More than 30 mins was spent face to face with the patient to discuss the diagnosis, treatment recommendations, and prognosis. Safety planning was also reviewed. The patient agreed to go to the nearest ER or call 911 if they experienced an emergency        The patient was admitted to the psychiatric unit and monitored for stabilization. A multidisciplinary team met with the patient on a daily basis.  The diagnosis, treatment recommendations, and prognosis were reviewed with the patient.       Objective:  Vital signs in last 24 hours:  Vitals:    07/28/20 0751   BP: 105/62   Pulse: 86   Resp: 16   Temp: 96.6 °F (35.9 °C)   SpO2: 99%       Labs:      Recent Results (from the past 504 hour(s))   CBC auto diff    Collection Time: 07/16/20  6:45 PM   Result Value Ref Range    WBC 5.9 4.0 - 10.5 K/CU MM    RBC 4.20 4.2 - 5.4 M/CU MM    Hemoglobin 12.6 12.5 - 16.0 GM/DL    Hematocrit 37.2 37 - 47 %    MCV 88.6 78 - 100 FL    MCH 30.0 27 - 31 PG    MCHC 33.9 32.0 - 36.0 %    RDW 11.9 11.7 - 14.9 %    Platelets 553 200 - 106 K/CU MM    MPV 9.1 7.5 - 11.1 FL    Differential Type AUTOMATED DIFFERENTIAL     Segs Relative 60.5 36 - 66 %    Lymphocytes % 28.8 24 - 44 %    Monocytes % 6.7 (H) 0 - 4 %    Eosinophils % 3.2 (H) 0 - 3 %    Basophils % 0.5 0 - 1 %    Segs Absolute 3.5 K/CU MM    Lymphocytes Absolute 1.7 K/CU MM    Monocytes Absolute 0.4 K/CU MM    Eosinophils Absolute 0.2 K/CU MM    Basophils Absolute 0.0 K/CU MM    Immature Neutrophil % 0.3 0 - 0.43 %    Total Immature Neutrophil 0.02 K/CU MM   CMP    Collection Time: 07/16/20  6:45 PM   Result Value Ref Range    Sodium 141 135 - 145 MMOL/L    Potassium 3.3 (L) 3.5 - 5.1 MMOL/L    Chloride 99 99 - 110 mMol/L    CO2 30 21 - 32 MMOL/L    BUN 21 6 - 23 MG/DL    CREATININE 0.8 0.6 - 1.1 MG/DL    Glucose 113 (H) 70 - 99 MG/DL    Calcium 9.7 8.3 - 10.6 MG/DL    Alb 4.5 3.4 - 5.0 GM/DL    Total Protein 7.2 6.4 - 8.2 GM/DL    Total Bilirubin 0.3 0.0 - 1.0 MG/DL    ALT 10 10 - 40 U/L    AST 18 15 - 37 IU/L    Alkaline Phosphatase 40 40 - 129 IU/L    GFR Non-African American >60 >60 mL/min/1.73m2    GFR African American >60 >60 mL/min/1.73m2    Anion Gap 12 4 - 16   ETOH Blood    Collection Time: 07/16/20  6:45 PM   Result Value Ref Range    Alcohol Scrn <0.01 <8.69 %WT/VOL   Salicylate Level    Collection Time: 07/16/20  6:45 PM   Result Value Ref Range    Salicylate Lvl 0.6 (L) 15 - 30 MG/DL    DOSE AMOUNT DOSE AMT. GIVEN - UNKNOWN     DOSE TIME DOSE TIME GIVEN - UNKNOWN    Acetaminophen Level    Collection Time: 07/16/20  6:45 PM   Result Value Ref Range    Acetaminophen Level <5.0 (L) 15 - 30 ug/ml    DOSE AMOUNT DOSE AMT. GIVEN - UNKNOWN     DOSE TIME DOSE TIME GIVEN - UNKNOWN    Drug screen multi urine    Collection Time: 07/16/20  7:19 PM   Result Value Ref Range    Cannabinoid Scrn, Ur NEGATIVE NEGATIVE    Amphetamines NEGATIVE NEGATIVE    Cocaine Metabolite NEGATIVE NEGATIVE    Benzodiazepine Screen, Urine NEGATIVE NEGATIVE    Barbiturate Screen, Ur NEGATIVE NEGATIVE    Opiates, Urine NEGATIVE NEGATIVE    Phencyclidine, Urine NEGATIVE NEGATIVE    Oxycodone NEGATIVE NEGATIVE   Urinalysis    Collection Time: 07/16/20  7:19 PM   Result Value Ref Range    Color, UA YELLOW YELLOW    Clarity, UA SLIGHTLY CLOUDY (A) CLEAR    Glucose, Urine NEGATIVE NEGATIVE MG/DL    Bilirubin Urine NEGATIVE NEGATIVE MG/DL    Ketones, Urine NEGATIVE NEGATIVE MG/DL    Specific Gravity, UA 1.010 1.001 - 1.035    Blood, Urine NEGATIVE NEGATIVE    pH, Urine 8.0 5.0 - 8.0    Protein, UA NEGATIVE NEGATIVE MG/DL    Urobilinogen, Urine 0.2 0.2 - 1.0 MG/DL    Nitrite Urine, Quantitative NEGATIVE NEGATIVE    Leukocyte Esterase, Urine LARGE (A) NEGATIVE    RBC, UA 3 0 - 6 /HPF    WBC, UA 8 (H) 0 - 5 /HPF    Epithelial Cells, UA 2 /HPF    Cast Type NO CAST FORMS SEEN NO CAST FORMS SEEN /HPF    Bacteria, UA OCCASIONAL (A) NEGATIVE /HPF    Crystal Type NEGATIVE NEGATIVE /HPF   COVID-19    Collection Time: 07/16/20  9:10 PM    Specimen: Nasopharyngeal Swab   Result Value Ref Range    Source NASOPHARYNGEAL SWAB     SARS-CoV-2, NAAT NOT DETECTED    Culture, Urine    Collection Time: 07/17/20 10:00 AM    Specimen: Urine, clean catch   Result Value Ref Range    Specimen URINE CLEAN CATCH     Special Requests NONE     Culture       Final Report  <10,000 CFU/ml mixed skin/urogenital saul.  No further workup     POCT Glucose    Collection Time: 07/17/20  5:12 PM Result Value Ref Range    POC Glucose 108 (H) 70 - 99 MG/DL   Basic Metabolic Panel w/ Reflex to MG    Collection Time: 07/18/20  6:00 AM   Result Value Ref Range    Sodium 143 135 - 145 MMOL/L    Potassium 4.3 3.5 - 5.1 MMOL/L    Chloride 103 99 - 110 mMol/L    CO2 25 21 - 32 MMOL/L    Anion Gap 15 4 - 16    BUN 15 6 - 23 MG/DL    CREATININE 0.8 0.6 - 1.1 MG/DL    Glucose 107 (H) 70 - 99 MG/DL    Calcium 9.7 8.3 - 10.6 MG/DL    GFR Non-African American >60 >60 mL/min/1.73m2    GFR African American >60 >60 mL/min/1.73m2   Hemoglobin A1c    Collection Time: 07/18/20  6:00 AM   Result Value Ref Range    Hemoglobin A1C 5.4 4.2 - 6.3 %    eAG 108 mg/dL   CBC auto differential    Collection Time: 07/18/20  6:00 AM   Result Value Ref Range    WBC 5.0 4.0 - 10.5 K/CU MM    RBC 4.17 (L) 4.2 - 5.4 M/CU MM    Hemoglobin 12.3 (L) 12.5 - 16.0 GM/DL    Hematocrit 37.3 37 - 47 %    MCV 89.4 78 - 100 FL    MCH 29.5 27 - 31 PG    MCHC 33.0 32.0 - 36.0 %    RDW 11.8 11.7 - 14.9 %    Platelets 277 831 - 294 K/CU MM    MPV 8.9 7.5 - 11.1 FL    Differential Type AUTOMATED DIFFERENTIAL     Segs Relative 54.9 36 - 66 %    Lymphocytes % 31.6 24 - 44 %    Monocytes % 8.3 (H) 0 - 4 %    Eosinophils % 3.4 (H) 0 - 3 %    Basophils % 0.6 0 - 1 %    Segs Absolute 2.8 K/CU MM    Lymphocytes Absolute 1.6 K/CU MM    Monocytes Absolute 0.4 K/CU MM    Eosinophils Absolute 0.2 K/CU MM    Basophils Absolute 0.0 K/CU MM    TRC 0.0509 K/CU MM    Immature Neutrophil % 1.2 (H) 0 - 0.43 %    Total Immature Neutrophil 0.06 K/CU MM   T4, free    Collection Time: 07/18/20  6:00 AM   Result Value Ref Range    T4 Free 2.14 (H) 0.9 - 1.8 NG/DL   Lipid panel - fasting    Collection Time: 07/18/20  6:00 AM   Result Value Ref Range    Triglycerides 123 <150 MG/DL    Cholesterol 171 <200 MG/DL    HDL 39 (L) >40 MG/DL    LDL Direct 117 (H) <100 MG/DL   TSH without Reflex    Collection Time: 07/18/20  6:00 AM   Result Value Ref Range    TSH, High Sensitivity 0.038 (L) 0.270 - 4.20 uIu/ml   Ammonia    Collection Time: 07/18/20  8:35 AM   Result Value Ref Range    Ammonia 29 11 - 51 UMOL/L   POCT Glucose    Collection Time: 07/18/20 10:12 AM   Result Value Ref Range    POC Glucose 109 (H) 70 - 99 MG/DL   POCT Glucose    Collection Time: 07/18/20 12:00 PM   Result Value Ref Range    POC Glucose 137 (H) 70 - 99 MG/DL   POCT Glucose    Collection Time: 07/18/20  4:48 PM   Result Value Ref Range    POC Glucose 105 (H) 70 - 99 MG/DL   POCT Glucose    Collection Time: 07/19/20 10:36 AM   Result Value Ref Range    POC Glucose 112 (H) 70 - 99 MG/DL   POCT Glucose    Collection Time: 07/19/20  6:22 PM   Result Value Ref Range    POC Glucose 169 (H) 70 - 99 MG/DL   POCT Glucose    Collection Time: 07/19/20  7:47 PM   Result Value Ref Range    POC Glucose 192 (H) 70 - 99 MG/DL   POCT Glucose    Collection Time: 07/20/20 10:00 AM   Result Value Ref Range    POC Glucose 157 (H) 70 - 99 MG/DL   POCT Glucose    Collection Time: 07/20/20  4:56 PM   Result Value Ref Range    POC Glucose 97 70 - 99 MG/DL   POCT Glucose    Collection Time: 07/21/20  8:21 AM   Result Value Ref Range    POC Glucose 111 (H) 70 - 99 MG/DL   POCT Glucose    Collection Time: 07/21/20  4:59 PM   Result Value Ref Range    POC Glucose 89 70 - 99 MG/DL   POCT Glucose    Collection Time: 07/22/20  8:41 AM   Result Value Ref Range    POC Glucose 136 (H) 70 - 99 MG/DL   POCT Glucose    Collection Time: 07/22/20  4:51 PM   Result Value Ref Range    POC Glucose 92 70 - 99 MG/DL   POCT Glucose    Collection Time: 07/23/20 10:08 AM   Result Value Ref Range    POC Glucose 156 (H) 70 - 99 MG/DL   Covid-19 Ambulatory    Collection Time: 07/23/20  4:00 PM   Result Value Ref Range    Source VIRAL SWAB     SARS-CoV-2 NOT DETECTED NOT DETECTED   POCT Glucose    Collection Time: 07/24/20  7:53 AM   Result Value Ref Range    POC Glucose 119 (H) 70 - 99 MG/DL   POCT Glucose    Collection Time: 07/24/20  6:31 PM   Result Value Ref Range POC Glucose 139 (H) 70 - 99 MG/DL   POCT Glucose    Collection Time: 07/25/20  8:38 AM   Result Value Ref Range    POC Glucose 126 (H) 70 - 99 MG/DL   POCT Glucose    Collection Time: 07/25/20  4:56 PM   Result Value Ref Range    POC Glucose 87 70 - 99 MG/DL   POCT Glucose    Collection Time: 07/26/20  7:27 AM   Result Value Ref Range    POC Glucose 127 (H) 70 - 99 MG/DL   POCT Glucose    Collection Time: 07/26/20  4:57 PM   Result Value Ref Range    POC Glucose 108 (H) 70 - 99 MG/DL   POCT Glucose    Collection Time: 07/27/20  7:58 AM   Result Value Ref Range    POC Glucose 107 (H) 70 - 99 MG/DL   POCT Glucose    Collection Time: 07/27/20  4:51 PM   Result Value Ref Range    POC Glucose 149 (H) 70 - 99 MG/DL       40 Minutes    Electronically signed by RENEE Royal CNP on 7/28/2020 at 3:21 PM

## 2020-07-28 NOTE — PROGRESS NOTES
2.5 mg by mouth every evening  clobetasol (TEMOVATE) 0.05 % cream, Apply topically 2 times daily Apply topically 2 times daily. Per daughter/POA on buttocks and around vaginal area  vitamin B-12 (CYANOCOBALAMIN) 1000 MCG tablet, Take 1,000 mcg by mouth daily  Cholecalciferol (VITAMIN D3) 50 MCG (2000 UT) CAPS, Take 25 capsules by mouth 1000 UT  valsartan-hydroCHLOROthiazide (DIOVAN-HCT) 160-25 MG per tablet, Take 1 tablet by mouth daily  [DISCONTINUED] mirtazapine (REMERON) 15 MG tablet, Take 7.5 mg by mouth nightly  [DISCONTINUED] metformin (GLUCOPHAGE) 500 MG tablet, Take 1,000 mg by mouth 2 times daily (with meals)   [DISCONTINUED] losartan (COZAAR) 25 MG tablet, Take 25 mg by mouth daily. [DISCONTINUED] exemestane (AROMASIN) 25 MG tablet, Take 25 mg by mouth daily. [DISCONTINUED] ezetimibe (ZETIA) 10 MG tablet, Take 10 mg by mouth daily. [DISCONTINUED] esomeprazole (NEXIUM) 40 MG capsule, Take 40 mg by mouth every morning (before breakfast).     [DISCONTINUED] levothyroxine (SYNTHROID) 125 MCG tablet, Take 100 mcg by mouth daily     Past Medical History:   Diagnosis Date    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HCC)     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HCC)     Cancer (HCC)     left breast    Diabetes mellitus ([facility]     Hyperlipidemia     Hypertension     Thyroid disease         Patient Active Problem List   Diagnosis    Subcortical vascular dementia with behavioral disturbance (HCC)    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HCC)    Diabetes mellitus ([facility]    Hyperlipidemia    Hypertension    Thyroid disease    Late onset Alzheimer's disease with behavioral disturbance ([facility]       Review of Systems    OBJECTIVE  Vital Signs:  Vitals:    07/28/20 0751   BP: 105/62   Pulse: 86   Resp: 16   Temp: 96.6 °F (35.9 °C)   SpO2: 99%       Labs:  Recent Results (from the past 48 hour(s))   POCT Glucose    Collection Time: 07/26/20  4:57 PM   Result Value Ref Range    POC Glucose 108 (H) 70 - 99 MG/DL   POCT Glucose    Collection Time: recommendations to include the risks, benefits, and side effects of chosen medications. The POA verbalized understanding and agreed with the treatment regimen as outlined above. The patient was encouraged to participate in groups. Medical records, Labs, Diagnotic tests reviewed  q15 min safety checks for safety  Interval History  Review current labs  Continue current medications  Supportive Therapy Provided  Pt had an opportunity to ask questions and address concerns  Pt encouraged to continue therapy group or individual.  Pt was in agreement with treatment plan. The risks benefits and side effects of medications were discussed with the POA, including alternatives and no treatment.     Electronically signed by RENEE Fajardo CNP on 7/28/2020 at 9:56 AM

## 2020-07-28 NOTE — PROGRESS NOTES
[address] [place] OF SPEECH/LANGUAGE PATHOLOGY  DAILY PROGRESS NOTE  Lea Maddox  7/28/2020  [id number]  Dementia with behavioral disturbance ([address] [F03.91]  Dementia with behavioral disturbance (HCC) [F03.91]  Allergies   Allergen Reactions    Aricept [Donepezil Hcl]     Molds & Smuts          Pt was seen this date for ongoing cognitive assessment. IMPRESSION AND RECOMMENDATIONS: Pt seen this date for ongoing cognitive assessment. Pt was oriented to self (name and birthday), not oriented to place or time. She did refer to the white board in [facility] common area for the date but did not read the date accurately stating it was August. When asked who the president was she stated \"Oh we don't like those people\". Pt participated in conversation with SLP but appeared to believe SLP was speaking to her to buy a painting. 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 The conversation was more relevant to her personally than past conversations have been. She was unable to state the city where she lived and did not answer factual questions, typically responded with information about her paintings. When SLP attempted more structured assessment, pt refused to participate. She did not become agitated during conversation with SLP-improved from previous visits. Discussed pt with RN and it is recommended pt receive SLP services upon discharge to provide functional treatment in a stable environment.      GOALS (current status in bold):      Short-term Goals  Timeframe for Short-term Goals: LOS  Goal 1: Pt will participate in ongoing cognitive assessment with intervention and goals adjusted as able ; ONGOING; Pt improved interaction today and was oriented to self and spoke about her past, refused any structured assessment by the SLP  Timeframe for Short-term Goals: LOS      EDUCATION: Pt did not appear able to retain new information.      PAIN RATING (0-10 Scale): 0  Time in/Time out: 0056-0812  Minutes: 20  Visit number:2    Myrna Piper 87, [facility]-SLP  7/28/2020  12:54 PM

## 2020-07-28 NOTE — PLAN OF CARE
Problem: Altered Mood, Deterioration in Function:  Goal: Ability to perform activities of daily living will improve  Description: Ability to perform activities of daily living will improve  7/27/2020 2343 by Lanie Rodriguez RN  Outcome: Ongoing  7/27/2020 1753 by Andrew Merritt RN  Outcome: Ongoing  Goal: Maintenance of adequate nutrition will improve  Description: Maintenance of adequate nutrition will improve  7/27/2020 2343 by Lanie Rodriguez RN  Outcome: Ongoing  7/27/2020 1753 by Andrew Merritt RN  Outcome: Ongoing  Goal: Participates in care planning  Description: Participates in care planning  7/27/2020 2343 by Lanie Rodriguez RN  Outcome: Ongoing  7/27/2020 1753 by Andrew Merritt RN  Outcome: Ongoing  Goal: Patient specific goal  Description: Patient specific goal  7/27/2020 2343 by Lanie Rodriguez RN  Outcome: Ongoing  7/27/2020 1753 by Andrew Merritt RN  Outcome: Ongoing     Problem: Nutrition Deficit:  Goal: Ability to achieve adequate nutritional intake will improve  Description: Ability to achieve adequate nutritional intake will improve  7/27/2020 2343 by Lanie Rodriguez RN  Outcome: Ongoing  7/27/2020 1753 by Andrew Merritt RN  Outcome: Ongoing

## 2020-07-28 NOTE — PROGRESS NOTES
Report called to nurse at [facility].  All questions answered and AVS faxed to [facility] and to daughter Omie Ganser, per request. Pending pickup via [facility] trans stretcher @ [phone]

## 2020-07-28 NOTE — GROUP NOTE
Group Therapy Note    Date: 7/28/2020    Group Start Time: 1400  Group End Time: 0520  Group Topic: Recreational    [address] Randy MA        Group Therapy Note    Attendees: 4/6       Notes:  Pts participated in recreational therapy group; Coloring Activity. Pts were encouraged to engage in a leisure activity to promote socialization, positive mood, and coping with negative emotions. Pt attended group, but noted to be withdrawn. Pt minimally engaged in group and did not respond to prompting. Pt noted to have depressed affect and spent majority of time staring out the window. Status After Intervention:  Unchanged    Participation Level: Minimal    Participation Quality: Withdrawn      Speech:  normal      Thought Process/Content: Pt noted to struggle with word recall.        Affective Functioning: Congruent      Mood: depressed      Level of consciousness:  Preoccupied      Response to Learning: Resistant      Endings: None Reported    Modes of Intervention: Socialization and Activity      Discipline Responsible: Certified Therapeutic Recreation Specialist       Signature:  Celina Juarez, [place], [place]

## 2020-07-28 NOTE — PROGRESS NOTES
Colten Stone called and scheduled  at 1815 to [facility]. Daughter and Facility notified.

## 2020-07-28 NOTE — GROUP NOTE
Group Therapy Note    Date: 7/28/2020    Group Start Time: 0830  Group End Time: 0845     Number of Participants: 2/6    Type: Morning Goals Group/ Community Meeting    Group Topic/Objective: Set Goal For The Day and to review Unit Rules and Regulations. Notes:  Pt sleeping and did not respond to attempts to wake pt up.      Discipline Responsible: Certified Therapeutic Recreation Specialist     Electronically signed by Geronimo Lopez, [address], [address] [place] [address] Randy on 7/28/2020 at 9:38 AM

## 2020-07-28 NOTE — CARE COORDINATION
Called [facility] [phone] was transferred to [facility]. Left a message inquiring about upcoming discharge. Awaiting call back.

## 2025-05-06 NOTE — ED NOTES
[facility] [id number] arrived. Report given attempted to call report. No answer. SBAR.    Med trans ETA 2330.      Aguila Murrell RN  07/16/20 6194